# Patient Record
Sex: MALE | Race: WHITE | NOT HISPANIC OR LATINO | Employment: OTHER | URBAN - NONMETROPOLITAN AREA
[De-identification: names, ages, dates, MRNs, and addresses within clinical notes are randomized per-mention and may not be internally consistent; named-entity substitution may affect disease eponyms.]

---

## 2022-12-22 ENCOUNTER — APPOINTMENT (OUTPATIENT)
Dept: PHYSICAL THERAPY | Facility: CLINIC | Age: 72
End: 2022-12-22

## 2022-12-27 ENCOUNTER — APPOINTMENT (EMERGENCY)
Dept: CT IMAGING | Facility: HOSPITAL | Age: 72
End: 2022-12-27

## 2022-12-27 ENCOUNTER — HOSPITAL ENCOUNTER (INPATIENT)
Facility: HOSPITAL | Age: 72
LOS: 3 days | Discharge: HOME | End: 2022-12-30
Attending: STUDENT IN AN ORGANIZED HEALTH CARE EDUCATION/TRAINING PROGRAM | Admitting: FAMILY MEDICINE

## 2022-12-27 ENCOUNTER — APPOINTMENT (OUTPATIENT)
Dept: PHYSICAL THERAPY | Facility: CLINIC | Age: 72
End: 2022-12-27

## 2022-12-27 DIAGNOSIS — R18.8 OTHER ASCITES: ICD-10-CM

## 2022-12-27 DIAGNOSIS — R18.8 ABDOMINAL ASCITES: ICD-10-CM

## 2022-12-27 DIAGNOSIS — K56.609 SBO (SMALL BOWEL OBSTRUCTION) (HCC): Primary | ICD-10-CM

## 2022-12-27 DIAGNOSIS — K42.0 UMBILICAL HERNIA WITH OBSTRUCTION: ICD-10-CM

## 2022-12-27 PROBLEM — I50.33 ACUTE ON CHRONIC DIASTOLIC CHF (CONGESTIVE HEART FAILURE) (HCC): Status: ACTIVE | Noted: 2022-12-27

## 2022-12-27 PROBLEM — K42.9 UMBILICAL HERNIA WITHOUT OBSTRUCTION AND WITHOUT GANGRENE: Status: ACTIVE | Noted: 2022-12-27

## 2022-12-27 PROBLEM — I50.43 ACUTE ON CHRONIC COMBINED SYSTOLIC AND DIASTOLIC CHF (CONGESTIVE HEART FAILURE) (HCC): Status: ACTIVE | Noted: 2022-12-27

## 2022-12-27 PROBLEM — C34.90 LUNG CANCER (HCC): Status: ACTIVE | Noted: 2022-12-27

## 2022-12-27 LAB
ALBUMIN SERPL BCP-MCNC: 1.3 G/DL (ref 3.5–5)
ALP SERPL-CCNC: 93 U/L (ref 46–116)
ALT SERPL W P-5'-P-CCNC: 40 U/L (ref 12–78)
ANION GAP SERPL CALCULATED.3IONS-SCNC: 9 MMOL/L (ref 4–13)
AST SERPL W P-5'-P-CCNC: 48 U/L (ref 5–45)
BASOPHILS # BLD AUTO: 0.02 THOUSANDS/ÂΜL (ref 0–0.1)
BASOPHILS NFR BLD AUTO: 1 % (ref 0–1)
BILIRUB SERPL-MCNC: 0.33 MG/DL (ref 0.2–1)
BILIRUB UR QL STRIP: NEGATIVE
BUN SERPL-MCNC: 26 MG/DL (ref 5–25)
CALCIUM ALBUM COR SERPL-MCNC: 9.8 MG/DL (ref 8.3–10.1)
CALCIUM SERPL-MCNC: 7.6 MG/DL (ref 8.3–10.1)
CHLORIDE SERPL-SCNC: 106 MMOL/L (ref 96–108)
CLARITY UR: CLEAR
CO2 SERPL-SCNC: 26 MMOL/L (ref 21–32)
COLOR UR: YELLOW
CREAT SERPL-MCNC: 1.39 MG/DL (ref 0.6–1.3)
EOSINOPHIL # BLD AUTO: 0.03 THOUSAND/ÂΜL (ref 0–0.61)
EOSINOPHIL NFR BLD AUTO: 1 % (ref 0–6)
ERYTHROCYTE [DISTWIDTH] IN BLOOD BY AUTOMATED COUNT: 14.8 % (ref 11.6–15.1)
GFR SERPL CREATININE-BSD FRML MDRD: 50 ML/MIN/1.73SQ M
GLUCOSE SERPL-MCNC: 145 MG/DL (ref 65–140)
GLUCOSE UR STRIP-MCNC: NEGATIVE MG/DL
HCT VFR BLD AUTO: 38 % (ref 36.5–49.3)
HGB BLD-MCNC: 11.8 G/DL (ref 12–17)
HGB UR QL STRIP.AUTO: NEGATIVE
IMM GRANULOCYTES # BLD AUTO: 0.02 THOUSAND/UL (ref 0–0.2)
IMM GRANULOCYTES NFR BLD AUTO: 1 % (ref 0–2)
INR PPP: 3.29 (ref 0.84–1.19)
KETONES UR STRIP-MCNC: NEGATIVE MG/DL
LACTATE SERPL-SCNC: 1.1 MMOL/L (ref 0.5–2)
LEUKOCYTE ESTERASE UR QL STRIP: NEGATIVE
LYMPHOCYTES # BLD AUTO: 0.95 THOUSANDS/ÂΜL (ref 0.6–4.47)
LYMPHOCYTES NFR BLD AUTO: 22 % (ref 14–44)
MCH RBC QN AUTO: 30.5 PG (ref 26.8–34.3)
MCHC RBC AUTO-ENTMCNC: 31.1 G/DL (ref 31.4–37.4)
MCV RBC AUTO: 98 FL (ref 82–98)
MONOCYTES # BLD AUTO: 0.32 THOUSAND/ÂΜL (ref 0.17–1.22)
MONOCYTES NFR BLD AUTO: 7 % (ref 4–12)
NEUTROPHILS # BLD AUTO: 2.96 THOUSANDS/ÂΜL (ref 1.85–7.62)
NEUTS SEG NFR BLD AUTO: 68 % (ref 43–75)
NITRITE UR QL STRIP: NEGATIVE
NRBC BLD AUTO-RTO: 0 /100 WBCS
PH UR STRIP.AUTO: 5.5 [PH]
PLATELET # BLD AUTO: 231 THOUSANDS/UL (ref 149–390)
PMV BLD AUTO: 10 FL (ref 8.9–12.7)
POTASSIUM SERPL-SCNC: 3.8 MMOL/L (ref 3.5–5.3)
PROT SERPL-MCNC: 4.7 G/DL (ref 6.4–8.4)
PROT UR STRIP-MCNC: NEGATIVE MG/DL
PROTHROMBIN TIME: 33.5 SECONDS (ref 11.6–14.5)
RBC # BLD AUTO: 3.87 MILLION/UL (ref 3.88–5.62)
SODIUM SERPL-SCNC: 141 MMOL/L (ref 135–147)
SP GR UR STRIP.AUTO: 1.02 (ref 1–1.03)
UROBILINOGEN UR QL STRIP.AUTO: 0.2 E.U./DL
WBC # BLD AUTO: 4.3 THOUSAND/UL (ref 4.31–10.16)

## 2022-12-27 RX ORDER — POTASSIUM BICARBONATE 25 MEQ/1
20 TABLET, EFFERVESCENT ORAL DAILY
COMMUNITY
End: 2022-12-30

## 2022-12-27 RX ORDER — SOTALOL HYDROCHLORIDE 80 MG/1
160 TABLET ORAL 2 TIMES DAILY
Status: DISCONTINUED | OUTPATIENT
Start: 2022-12-27 | End: 2022-12-30 | Stop reason: HOSPADM

## 2022-12-27 RX ORDER — ALBUMIN (HUMAN) 12.5 G/50ML
12.5 SOLUTION INTRAVENOUS ONCE
Status: COMPLETED | OUTPATIENT
Start: 2022-12-27 | End: 2022-12-27

## 2022-12-27 RX ORDER — ATORVASTATIN CALCIUM 40 MG/1
80 TABLET, FILM COATED ORAL DAILY
Status: DISCONTINUED | OUTPATIENT
Start: 2022-12-28 | End: 2022-12-30 | Stop reason: HOSPADM

## 2022-12-27 RX ORDER — PANTOPRAZOLE SODIUM 40 MG/1
40 TABLET, DELAYED RELEASE ORAL DAILY
Status: DISCONTINUED | OUTPATIENT
Start: 2022-12-28 | End: 2022-12-30 | Stop reason: HOSPADM

## 2022-12-27 RX ORDER — ACETAMINOPHEN 325 MG/1
650 TABLET ORAL EVERY 6 HOURS PRN
Status: DISCONTINUED | OUTPATIENT
Start: 2022-12-27 | End: 2022-12-30 | Stop reason: HOSPADM

## 2022-12-27 RX ORDER — FENTANYL CITRATE 50 UG/ML
75 INJECTION, SOLUTION INTRAMUSCULAR; INTRAVENOUS ONCE
Status: DISCONTINUED | OUTPATIENT
Start: 2022-12-27 | End: 2022-12-30 | Stop reason: HOSPADM

## 2022-12-27 RX ORDER — BUMETANIDE 0.25 MG/ML
2 INJECTION, SOLUTION INTRAMUSCULAR; INTRAVENOUS 2 TIMES DAILY
Status: COMPLETED | OUTPATIENT
Start: 2022-12-27 | End: 2022-12-28

## 2022-12-27 RX ORDER — FENTANYL CITRATE 50 UG/ML
75 INJECTION, SOLUTION INTRAMUSCULAR; INTRAVENOUS ONCE
Status: COMPLETED | OUTPATIENT
Start: 2022-12-27 | End: 2022-12-27

## 2022-12-27 RX ORDER — CLOPIDOGREL BISULFATE 75 MG/1
75 TABLET ORAL DAILY
Status: DISCONTINUED | OUTPATIENT
Start: 2022-12-28 | End: 2022-12-30 | Stop reason: HOSPADM

## 2022-12-27 RX ORDER — BUMETANIDE 2 MG/1
2 TABLET ORAL
COMMUNITY
End: 2022-12-30

## 2022-12-27 RX ORDER — MELATONIN
2000 DAILY
COMMUNITY

## 2022-12-27 RX ORDER — BUMETANIDE 2 MG/1
2 TABLET ORAL DAILY
COMMUNITY
Start: 2022-11-27 | End: 2022-12-30

## 2022-12-27 RX ORDER — ONDANSETRON 2 MG/ML
4 INJECTION INTRAMUSCULAR; INTRAVENOUS EVERY 6 HOURS PRN
Status: DISCONTINUED | OUTPATIENT
Start: 2022-12-27 | End: 2022-12-30 | Stop reason: HOSPADM

## 2022-12-27 RX ORDER — ISOSORBIDE MONONITRATE 60 MG/1
60 TABLET, EXTENDED RELEASE ORAL DAILY
Status: DISCONTINUED | OUTPATIENT
Start: 2022-12-28 | End: 2022-12-30 | Stop reason: HOSPADM

## 2022-12-27 RX ORDER — GUAIFENESIN 100 MG/5ML
200 SOLUTION ORAL EVERY 4 HOURS PRN
Status: DISCONTINUED | OUTPATIENT
Start: 2022-12-27 | End: 2022-12-29

## 2022-12-27 RX ORDER — MAGNESIUM 30 MG
400 TABLET ORAL DAILY
COMMUNITY

## 2022-12-27 RX ADMIN — ALBUMIN (HUMAN) 12.5 G: 0.25 INJECTION, SOLUTION INTRAVENOUS at 20:10

## 2022-12-27 RX ADMIN — BUMETANIDE 2 MG: 0.25 INJECTION, SOLUTION INTRAMUSCULAR; INTRAVENOUS at 20:10

## 2022-12-27 RX ADMIN — IOHEXOL 100 ML: 350 INJECTION, SOLUTION INTRAVENOUS at 15:17

## 2022-12-27 RX ADMIN — SOTALOL HYDROCHLORIDE 160 MG: 80 TABLET ORAL at 20:10

## 2022-12-27 RX ADMIN — GUAIFENESIN 200 MG: 200 SOLUTION ORAL at 22:17

## 2022-12-27 RX ADMIN — FENTANYL CITRATE 75 MCG: 50 INJECTION INTRAMUSCULAR; INTRAVENOUS at 14:24

## 2022-12-27 NOTE — ASSESSMENT & PLAN NOTE
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, RLL  9/13/18-11/15/18: 4 cycles of carboplatin, pemetrexed and pembrolizumab  12/17/18-8/13/20: maintenance pemetrexed and pembrolizumb - stopped given hypoalbuminemia/pleural effusions/swelling after 2 years of treatment- now on observation  Also has known mesenteric mass  Follow up outpatient with heme-onc

## 2022-12-27 NOTE — H&P
[address]  H&P- Zana Harris 1950, 71 y o  male MRN: [id number]  Unit/Bed#: CLYDE Encounter: [id number]  Primary Care Provider: Stevan Lawrence MD   Date and time admitted to hospital: 12/27/2022  1:43 PM    * SBO (small bowel obstruction) [facility])  Assessment & Plan  Patient presented with sbo on admission  Ct abd Small bowel obstruction with transition point located within an umbilical hernia containing a short segment of mid small bowel  Reidentified known approximate 5 cm left mesenteric mass  Mesenteric vasculature distal to this mass is engorged as also described on outside reports  Nodular hepatic contours may indicate cirrhosis/hepatocellular disease    Large quantity of abdominal ascites  Moderate right and small left pleural effusions  he also had an umbilical hernia present on admission which was manually reduced by general surgery in the ED  Will place on a clear liquid diet and advance as tolerated    Acute on chronic diastolic CHF (congestive heart failure) ([facility] [address] )  Assessment & Plan  Wt Readings from Last 3 Encounters:   12/27/22 88 kg (194 lb 0 1 oz)       Patient takes Bumex 2 mg daily on even days and 2 mg twice daily on days  Presents with lower extremity edema and ascites  Also has history of underlying malignancy with mesenteric mass which may also be contributing to ascites  Placed on bumex 2 mg IV twice daily and monitor electrolytes closely  Also asked critical care for paracentesis  Hold Coumadin for now  No 2D echo available in the system for review    Ordered 2D echo to evaluate LV function      Lung cancer [facility])  Assessment & Plan  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, RLL  9/13/18-11/15/18: 4 cycles of carboplatin, pemetrexed and pembrolizumab  12/17/18-8/13/20: maintenance pemetrexed and pembrolizumb - stopped given hypoalbuminemia/pleural effusions/swelling after 2 years of treatment- now on observation  Also has known mesenteric mass  Follow up outpatient with heme-onc      Other ascites  Assessment & Plan  Secondary to CHF versus underlying malignancy or secondary to liver dysfunction  Ask critical care for paracentesis  Could Also be worsening small bowel obstruction  No known history of liver cirrhosis or hepatitis or alcohol abuse    Umbilical hernia without obstruction and without gangrene  Assessment & Plan  umbilical hernia without obstruction or gangrene present admission which was manually reduced by general surgery however will need outpatient follow-up    VTE Prophylaxis: Warfarin (Coumadin)  / sequential compression device   Code Status: full code  POLST: There is no POLST form on file for this patient (pre-hospital)  Discussion with family: Discussed with wife at bedside    Anticipated Length of Stay:  Patient will be admitted on an Inpatient basis with an anticipated length of stay of more than 2 midnights  Justification for Hospital Stay: Small bowel obstruction    Total Time for Visit, including Counseling / Coordination of Care: 60 minutes  Greater than 50% of this total time spent on direct patient counseling and coordination of care  Chief Complaint:   abdominal pain    History of Present Illness: Sabino Roberts is a 71 y o  male who presents with abdominal pain that started yesterday night  Patient states that he had periumbilical pain and also felt his hernia popping out  Difficulty tolerating food  Denies any diarrhea  No vomiting reported  Complaining of more bloating in the abdomen and some leg swelling reported which is worse than baseline    Review of Systems:    Review of Systems   Constitutional: Positive for appetite change and fatigue  Negative for chills and fever  HENT: Negative for hearing loss, sore throat and trouble swallowing  Eyes: Negative for photophobia, discharge and visual disturbance  Respiratory: Negative for chest tightness and shortness of breath      Cardiovascular: Positive for leg swelling  Negative for chest pain and palpitations  Gastrointestinal: Positive for abdominal distention, abdominal pain and nausea  Negative for blood in stool and vomiting  Endocrine: Negative for polydipsia and polyuria  Genitourinary: Negative for difficulty urinating, dysuria, flank pain and hematuria  Musculoskeletal: Negative for back pain and gait problem  Skin: Negative for rash  Allergic/Immunologic: Negative for environmental allergies and food allergies  Neurological: Negative for dizziness, seizures, syncope and headaches  Hematological: Does not bruise/bleed easily  Psychiatric/Behavioral: Negative for behavioral problems  All other systems reviewed and are negative  Past Medical and Surgical History:     Past Medical History:   Diagnosis Date   • CHF (congestive heart failure) ([facility] )    • Lung cancer ([facility] )     stage 4       Past Surgical History:   Procedure Laterality Date   • CHOLECYSTECTOMY         Meds/Allergies:    Prior to Admission medications    Medication Sig Start Date End Date Taking?  Authorizing Provider   bumetanide (BUMEX) 2 mg tablet Take 2 mg by mouth in the morning 11/27/22 12/31/22 Yes Historical Provider, MD   bumetanide (BUMEX) 2 mg tablet Take 2 mg by mouth every other day In the afternoon on odd days   Yes Historical Provider, MD   atorvastatin (LIPITOR) 80 mg tablet Take 80 mg by mouth daily    Historical Provider, MD   clopidogrel (PLAVIX) 75 mg tablet Take 75 mg by mouth daily    Historical Provider, MD   isosorbide mononitrate (IMDUR) 60 mg 24 hr tablet Take 60 mg by mouth daily    Historical Provider, MD   losartan (COZAAR) 100 MG tablet Take 25 mg by mouth daily    Historical Provider, MD   pantoprazole (PROTONIX) 40 mg tablet Take 40 mg by mouth daily    Historical Provider, MD   sotalol (BETAPACE) 160 MG tablet Take 160 mg by mouth 2 (two) times a day    Historical Provider, MD   warfarin (COUMADIN) 2 5 mg tablet Take by mouth daily Historical Provider, MD   aspirin 81 mg chewable tablet Chew 81 mg daily  22  Historical Provider, MD     I have reviewed home medications with patient personally  Allergies: Allergies   Allergen Reactions   • Acetaminophen Other (See Comments)     Unable to take with Percocet   • Emend [Aprepitant] Hives   • Percocet [Oxycodone-Acetaminophen] Delirium       Social History:     Marital Status: /Civil Union     Social History     Substance and Sexual Activity   Alcohol Use Not Currently     Social History     Tobacco Use   Smoking Status Former   • Types: Cigarettes   • Quit date:    • Years since quittin 0   • Passive exposure: Past   Smokeless Tobacco Never     Social History     Substance and Sexual Activity   Drug Use Never       Family History:    Family History   Problem Relation Age of Onset   • Hypertension Father        Physical Exam:     Vitals:   Blood Pressure: 118/81 (22 1243)  Pulse: 63 (22 1243)  Temperature: (!) 97 2 °F (36 2 °C) (22 1243)  Temp Source: Temporal (22 1243)  Respirations: 18 (22 1243)  Height: 5' 7" (170 2 cm) (22 1243)  Weight - Scale: 88 kg (194 lb 0 1 oz) (22 1243)  SpO2: 95 % (22 1243)    Physical Exam  Vitals and nursing note reviewed  Constitutional:       Appearance: He is ill-appearing  HENT:      Head: Normocephalic and atraumatic  Right Ear: External ear normal       Left Ear: External ear normal       Nose: Nose normal       Mouth/Throat:      Pharynx: Oropharynx is clear  Eyes:      Pupils: Pupils are equal, round, and reactive to light  Cardiovascular:      Rate and Rhythm: Normal rate and regular rhythm  Heart sounds: Normal heart sounds  Pulmonary:      Effort: Pulmonary effort is normal       Breath sounds: Normal breath sounds  Abdominal:      General: Bowel sounds are normal  There is distension  Palpations: Abdomen is soft  Tenderness:  There is abdominal tenderness  Comments: reducible hernia noted at the umbilicus   Musculoskeletal:         General: Normal range of motion  Cervical back: Normal range of motion and neck supple  Right lower leg: Edema present  Left lower leg: Edema present  Skin:     General: Skin is warm and dry  Capillary Refill: Capillary refill takes less than 2 seconds  Neurological:      General: No focal deficit present  Mental Status: He is alert and oriented to person, place, and time  Psychiatric:         Mood and Affect: Mood normal            Additional Data:     Lab Results: I have personally reviewed pertinent reports  Results from last 7 days   Lab Units 12/27/22  1423   WBC Thousand/uL 4 30*   HEMOGLOBIN g/dL 11 8*   HEMATOCRIT % 38 0   PLATELETS Thousands/uL 231   NEUTROS PCT % 68   LYMPHS PCT % 22   MONOS PCT % 7   EOS PCT % 1     Results from last 7 days   Lab Units 12/27/22  1423   SODIUM mmol/L 141   POTASSIUM mmol/L 3 8   CHLORIDE mmol/L 106   CO2 mmol/L 26   BUN mg/dL 26*   CREATININE mg/dL 1 39*   ANION GAP mmol/L 9   CALCIUM mg/dL 7 6*   ALBUMIN g/dL 1 3*   TOTAL BILIRUBIN mg/dL 0 33   ALK PHOS U/L 93   ALT U/L 40   AST U/L 48*   GLUCOSE RANDOM mg/dL 145*                 Results from last 7 days   Lab Units 12/27/22  1423   LACTIC ACID mmol/L 1 1       Imaging: I have personally reviewed pertinent reports  CT abdomen pelvis with contrast   Final Result by Ector Kellogg MD (12/27 5619)      Small bowel obstruction with transition point located within an umbilical hernia containing a short segment of mid small bowel  Reidentified known approximate 5 cm left mesenteric mass  Mesenteric vasculature distal to this mass is engorged as also described on outside reports  Nodular hepatic contours may indicate cirrhosis/hepatocellular disease  Large quantity of abdominal ascites  Moderate right and small left pleural effusions        The study was marked in [facility] for immediate notification  Workstation performed: [id number]             EKG, Pathology, and Other Studies Reviewed on Admission:   · EKG: reviewed    Allscripts / Epic Records Reviewed: Yes     ** Please Note: This note has been constructed using a voice recognition system   **

## 2022-12-27 NOTE — ASSESSMENT & PLAN NOTE
Secondary to CHF versus underlying malignancy or secondary to liver dysfunction  Ask critical care for paracentesis  Could Also be worsening small bowel obstruction  No known history of liver cirrhosis or hepatitis or alcohol abuse

## 2022-12-27 NOTE — ASSESSMENT & PLAN NOTE
Wt Readings from Last 3 Encounters:   12/27/22 88 kg (194 lb 0 1 oz)       Patient takes Bumex 2 mg daily on even days and 2 mg twice daily on days  Presents with lower extremity edema and ascites  Also has history of underlying malignancy with mesenteric mass which may also be contributing to ascites  Placed on bumex 2 mg IV twice daily and monitor electrolytes closely  Also asked critical care for paracentesis  Hold Coumadin for now  No 2D echo available in the system for review    Ordered 2D echo to evaluate LV function

## 2022-12-27 NOTE — ASSESSMENT & PLAN NOTE
Patient presented with sbo on admission  Ct abd Small bowel obstruction with transition point located within an umbilical hernia containing a short segment of mid small bowel  Reidentified known approximate 5 cm left mesenteric mass  Mesenteric vasculature distal to this mass is engorged as also described on outside reports  Nodular hepatic contours may indicate cirrhosis/hepatocellular disease    Large quantity of abdominal ascites  Moderate right and small left pleural effusions  he also had an umbilical hernia present on admission which was manually reduced by general surgery in the ED    Will place on a clear liquid diet and advance as tolerated

## 2022-12-27 NOTE — CONSULTS
Consultation - General Surgery   David Hernandez 71 y o  male MRN: [id number]  Unit/Bed#: TR 13A Encounter: [id number]    Assessment/Plan     Assessment:    Small bowel obstruction  Umbilical Hernia containing bowel and ascites- reduced  Stage IV Lung cancer  Mesenteric mass  Ascites  Cirrhosis  CHF  Atrial fibrillation - on Warfarin  CAD - s/p PCI/Stent - on Plavix  KIRA  Anemia of Chronic   Malnutrition-Hypoalbuminemia    Plan: Bowel reduced at bedside and symptoms improved  Patient would be high risk of mortality for surgical intervention in the emergent setting given his Ascites, CHF, Malnutrition with stage IV lung CA and undiagnosed mesenteric mass  Ideally his umbilical hernia would be repaired at a tertiary care center where he receives his cancer care and biopsy of the mesenteric mass could be entertained at that time  For now recommend observing the patient with bowel obstruction now that his bowel has been reduced and the obstruction should be relived  Recommend a small bowel follow through series (gastrograffin challenge) tomorrow morning to ensure passage of contrast to the colon  Supportive care, NPO for now  Hold Plavix and Warfarin for now  History of Present Illness     HPI:  David Hernandez is a 71 y o  male who presents with Stage IV adenocarcinoma of the lung s/p Chemotherapy  He has a known mesenteric mass and ascites of unknown origin- concern is for metastatic disease  He has CHF, atrial fibrillation  He is on Warfarin  He is on Plavix for CAD s/p PCI/Stent  He states he developed abdominal pain overnight which continued into this morning with nausea  He had not passed flatus since yesterday  He has a known hernia and states his pain was worse over the hernia today and felt that he'd better get checked out  He has had the hernia for a long time and states it has never given him a problem before  He denies any other abdominal surgeries       Consult to surgery general  Consult performed by: Shaista Rowland DO  Consult ordered by: Shaina Lion DO          Review of Systems   Constitutional: Negative  HENT: Negative  Eyes: Negative  Respiratory: Negative  Cardiovascular: Negative  Gastrointestinal: Positive for abdominal distention, abdominal pain and nausea  Negative for blood in stool and diarrhea  Endocrine: Negative  Genitourinary: Negative  Musculoskeletal: Negative  Skin: Negative  Allergic/Immunologic: Negative  Neurological: Negative  Hematological: Negative  Psychiatric/Behavioral: Negative  Historical Information   Past Medical History:   Diagnosis Date   • CHF (congestive heart failure) ([facility] )    • Lung cancer ([facility] )     stage 4     History reviewed  No pertinent surgical history    Social History   Social History     Substance and Sexual Activity   Alcohol Use Not Currently     Social History     Substance and Sexual Activity   Drug Use Never     E-Cigarette/Vaping   • E-Cigarette Use Never User      E-Cigarette/Vaping Substances     Social History     Tobacco Use   Smoking Status Former   • Types: Cigarettes   • Quit date:    • Years since quittin 0   • Passive exposure: Past   Smokeless Tobacco Never     Family History: non-contributory    Meds/Allergies   all current active meds have been reviewed  Allergies   Allergen Reactions   • Acetaminophen Other (See Comments)     Unable to take with Percocet   • Emend [Aprepitant] Hives   • Percocet [Oxycodone-Acetaminophen] Delirium       Objective   First Vitals:   Blood Pressure: 118/81 (22 1243)  Pulse: 63 (22 1243)  Temperature: (!) 97 2 °F (36 2 °C) (22 1243)  Temp Source: Temporal (22 1243)  Respirations: 18 (22 1243)  Height: 5' 7" (170 2 cm) (22 1243)  Weight - Scale: 88 kg (194 lb 0 1 oz) (22 1243)  SpO2: 95 % (22 1243)    Current Vitals:   Blood Pressure: 118/81 (22 1243)  Pulse: 63 (22 1243)  Temperature: (!) 97 2 °F (36 2 °C) (12/27/22 1243)  Temp Source: Temporal (12/27/22 1243)  Respirations: 18 (12/27/22 1243)  Height: 5' 7" (170 2 cm) (12/27/22 1243)  Weight - Scale: 88 kg (194 lb 0 1 oz) (12/27/22 1243)  SpO2: 95 % (12/27/22 1243)    No intake or output data in the 24 hours ending 12/27/22 1654    Invasive Devices     Peripheral Intravenous Line  Duration           Peripheral IV 12/27/22 Distal;Right;Upper;Ventral (anterior) Arm <1 day                Physical Exam  Vitals and nursing note reviewed  Constitutional:       General: He is not in acute distress  Appearance: He is not ill-appearing  HENT:      Head: Normocephalic  Mouth/Throat:      Mouth: Mucous membranes are moist       Pharynx: Oropharynx is clear  Eyes:      Conjunctiva/sclera: Conjunctivae normal       Pupils: Pupils are equal, round, and reactive to light  Cardiovascular:      Rate and Rhythm: Normal rate and regular rhythm  Pulses: Normal pulses  Pulmonary:      Effort: Pulmonary effort is normal    Abdominal:      Comments: Soft, Tender over umbilical hernia which is engorged with ascites  Hernia is soft  There is bowel palpable which is reducible  No overlying skin breakdown or compromise  No palpable masses, no rebound or guarding  + fluid wave  Musculoskeletal:      Cervical back: Normal range of motion  Skin:     General: Skin is warm  Capillary Refill: Capillary refill takes less than 2 seconds  Neurological:      General: No focal deficit present  Mental Status: He is alert and oriented to person, place, and time  Lab Results:   I have personally reviewed pertinent lab results    , CBC:   Lab Results   Component Value Date    WBC 4 30 (L) 12/27/2022    HGB 11 8 (L) 12/27/2022    HCT 38 0 12/27/2022    MCV 98 12/27/2022     12/27/2022    MCH 30 5 12/27/2022    MCHC 31 1 (L) 12/27/2022    RDW 14 8 12/27/2022    MPV 10 0 12/27/2022    NRBC 0 12/27/2022   , CMP:   Lab Results   Component Value Date    SODIUM 141 12/27/2022    K 3 8 12/27/2022     12/27/2022    CO2 26 12/27/2022    BUN 26 (H) 12/27/2022    CREATININE 1 39 (H) 12/27/2022    CALCIUM 7 6 (L) 12/27/2022    AST 48 (H) 12/27/2022    ALT 40 12/27/2022    ALKPHOS 93 12/27/2022    EGFR 50 12/27/2022     Imaging: I have personally reviewed pertinent reports  and I have personally reviewed pertinent films in PACS  EKG, Pathology, and Other Studies: I have personally reviewed pertinent reports  Counseling / Coordination of Care  Total floor / unit time spent today 60 minutes  Greater than 50% of total time was spent with the patient and / or family counseling and / or coordination of care  A description of the counseling / coordination of care: discussion with ED, counseling patient regarding his disease process and expectations        Dustin Roa

## 2022-12-27 NOTE — LETTER
[facility]  [address] [place] [address]      January 6, 2023    MRN: [id number]     Phone: [phone]     Dear Mr Wood Story recently had a(n)  performed on  at  [facility] that was requested by Apolinar Babcock DO  The study was reviewed by a radiologist, which is a physician who specializes in medical imaging  The radiologist issued a report describing his or her findings  In that report there was a finding that the radiologist felt warranted further discussion with your health care provider and that discussion would be beneficial to you  The results were sent to Apolinar Babcock DO on    We recommend that you contact Apolinar Babcock DO at  or set up an appointment to discuss the results of the imaging test  If you have already heard from Apolinar Babcock DO regarding the results of your study, you can disregard this letter  This letter is not meant to alarm you, but intended to encourage you to follow-up on your results with the provider that sent you for the imaging study  In addition, we have enclosed answers to frequently asked questions by other patients who have also received a letter to review results with their health care provider (see page two)  Thank you for choosing [facility] for your medical imaging needs  FREQUENTLY ASKED QUESTIONS    1  Why am I receiving this letter? [facility] requires us to notify patients who have findings on imaging exams that may require more testing or follow-up with a health professional within the next 3 months  2  How serious is the finding on the imaging test?  This letter is sent to all patients who may need follow-up or more testing within the next 3 months    Receiving this letter does not necessarily mean you have a life-threatening imaging finding or disease  Recommendations in the radiologist’s imaging report are general in nature and it is up to your healthcare provider to say whether those recommendations make sense for your situation  You are strongly encouraged to talk to your health care provider about the results and ask whether additional steps need to be taken  3  Where can I get a copy of the final report for my recent radiology exam?  To get a full copy of the report you can access your records online at [email or web address] or please contact [facility] at [phone] Monday through Friday between 8 am and 6 pm          4  What do I need to do now? Please contact your health care provider who requested the imaging study to discuss what further actions (if any) are needed  You may have already heard from (your ordering provider) in regard to this test in which case you can disregard this letter  NOTICE IN ACCORDANCE WITH THE [place] STATE “PATIENT TEST RESULT INFORMATION ACT OF 2018”    You are receiving this notice as a result of a determination by your diagnostic imaging service that further discussions of your test results are warranted and would be beneficial to you  The complete results of your test or tests have been or will be sent to the health care practitioner that ordered the test or tests  It is recommended that you contact your health care practitioner to discuss your results as soon as possible

## 2022-12-27 NOTE — ASSESSMENT & PLAN NOTE
umbilical hernia without obstruction or gangrene present admission which was manually reduced by general surgery however will need outpatient follow-up

## 2022-12-27 NOTE — ED PROVIDER NOTES
History  Chief Complaint   Patient presents with   • Abdominal Pain     Pt c/o abdomial pain starting last night stating "feels bloated"  Took gasx w/o relief  Hx umbilical hernia, chf  Denies travel/sob/fever/cough/n/v/d       History provided by:  Patient  Abdominal Pain  Pain location:  Periumbilical  Pain quality: stabbing and throbbing    Pain radiates to:  Does not radiate  Pain severity:  Moderate  Onset quality:  Gradual  Duration:  1 day  Timing:  Constant  Progression:  Worsening  Chronicity:  New  Context comment:  Hx of umbilical hernia  Worsening pain around the hernia  Having decreased flatus  Normal BM this AM  Denies nausea/vomiting/diarrhea  Relieved by:  Nothing  Worsened by: Movement, coughing and position changes  Associated symptoms: no anorexia, no chest pain, no chills, no constipation, no cough, no diarrhea, no dysuria, no fatigue, no fever, no nausea, no shortness of breath, no sore throat and no vomiting      Prior to Admission Medications   Prescriptions Last Dose Informant Patient Reported? Taking?   aspirin 81 mg chewable tablet   Yes No   Sig: Chew 81 mg daily   atorvastatin (LIPITOR) 80 mg tablet   Yes No   Sig: Take 80 mg by mouth daily   clopidogrel (PLAVIX) 75 mg tablet   Yes No   Sig: Take 75 mg by mouth daily   isosorbide mononitrate (IMDUR) 60 mg 24 hr tablet   Yes No   Sig: Take 60 mg by mouth daily   losartan (COZAAR) 100 MG tablet   Yes No   Sig: Take 25 mg by mouth daily   pantoprazole (PROTONIX) 40 mg tablet   Yes No   Sig: Take 40 mg by mouth daily   sotalol (BETAPACE) 160 MG tablet   Yes No   Sig: Take 160 mg by mouth 2 (two) times a day   warfarin (COUMADIN) 2 5 mg tablet   Yes No   Sig: Take by mouth daily      Facility-Administered Medications: None     Past Medical History:   Diagnosis Date   • CHF (congestive heart failure) (HCC)    • Lung cancer (HCC)     stage 4     History reviewed  No pertinent surgical history  History reviewed   No pertinent family history  I have reviewed and agree with the history as documented  E-Cigarette/Vaping   • E-Cigarette Use Never User      E-Cigarette/Vaping Substances     Social History     Tobacco Use   • Smoking status: Former     Types: Cigarettes     Quit date:      Years since quittin 0     Passive exposure: Past   • Smokeless tobacco: Never   Vaping Use   • Vaping Use: Never used   Substance Use Topics   • Alcohol use: Not Currently   • Drug use: Never     Review of Systems   Constitutional: Positive for activity change and appetite change  Negative for chills, fatigue and fever  HENT: Negative for congestion, rhinorrhea, sinus pressure, sinus pain and sore throat  Eyes: Negative for photophobia, pain, discharge and redness  Respiratory: Negative for cough, chest tightness, shortness of breath and wheezing  Cardiovascular: Negative for chest pain and palpitations  Gastrointestinal: Positive for abdominal pain  Negative for abdominal distention, anorexia, blood in stool, constipation, diarrhea, nausea and vomiting  Genitourinary: Negative for decreased urine volume, difficulty urinating, dysuria, flank pain, frequency and urgency  Musculoskeletal: Negative for back pain, myalgias, neck pain and neck stiffness  Skin: Negative for color change, pallor, rash and wound  Neurological: Negative for dizziness, syncope, weakness, light-headedness and headaches  Hematological: Does not bruise/bleed easily  Psychiatric/Behavioral: Positive for sleep disturbance  Negative for confusion  All other systems reviewed and are negative  Physical Exam  Physical Exam  Vitals and nursing note reviewed  Exam conducted with a chaperone present  Constitutional:       General: He is in acute distress  Appearance: He is not ill-appearing or toxic-appearing  HENT:      Head: Normocephalic and atraumatic  Eyes:      General: No scleral icterus  Extraocular Movements: Extraocular movements intact  Pupils: Pupils are equal, round, and reactive to light  Cardiovascular:      Rate and Rhythm: Normal rate and regular rhythm  Heart sounds: Normal heart sounds  No murmur heard  Pulmonary:      Effort: Pulmonary effort is normal  No respiratory distress  Breath sounds: Normal breath sounds  No stridor  No wheezing, rhonchi or rales  Chest:      Chest wall: No tenderness  Abdominal:      General: Bowel sounds are normal       Palpations: Abdomen is soft  Tenderness: There is abdominal tenderness in the periumbilical area  Hernia: A hernia is present  Hernia is present in the umbilical area  Comments: TTP along the umbilical hernia  Unable to reduce hernia due to pain  Normoactive bowel sounds  Skin:     General: Skin is warm and dry  Capillary Refill: Capillary refill takes less than 2 seconds  Coloration: Skin is pale  Skin is not cyanotic, jaundiced or mottled  Findings: No erythema or rash  Neurological:      General: No focal deficit present  Mental Status: He is alert and oriented to person, place, and time  Cranial Nerves: No cranial nerve deficit  Motor: No weakness  Psychiatric:         Mood and Affect: Mood is not anxious or depressed           Behavior: Behavior normal        Vital Signs  ED Triage Vitals [12/27/22 1243]   Temperature Pulse Respirations Blood Pressure SpO2   (!) 97 2 °F (36 2 °C) 63 18 118/81 95 %      Temp Source Heart Rate Source Patient Position - Orthostatic VS BP Location FiO2 (%)   Temporal Monitor Sitting Left arm --      Pain Score       4           Vitals:    12/27/22 1243   BP: 118/81   Pulse: 63   Patient Position - Orthostatic VS: Sitting     ED Medications  Medications   fentanyl citrate (PF) 100 MCG/2ML 75 mcg (has no administration in time range)   fentanyl citrate (PF) 100 MCG/2ML 75 mcg (75 mcg Intravenous Given 12/27/22 1424)   iohexol (OMNIPAQUE) 350 MG/ML injection (MULTI-DOSE) 100 mL (100 mL Intravenous Given 12/27/22 1517)     Diagnostic Studies  Results Reviewed     Procedure Component Value Units Date/Time    Lactic acid, plasma [[id number]]  (Normal) Collected: 12/27/22 1423    Lab Status: Final result Specimen: Blood from Arm, Right Updated: 12/27/22 1448     LACTIC ACID 1 1 mmol/L     Narrative:      Result may be elevated if tourniquet was used during collection      Comprehensive metabolic panel [[id number]]  (Abnormal) Collected: 12/27/22 1423    Lab Status: Final result Specimen: Blood from Arm, Right Updated: 12/27/22 1448     Sodium 141 mmol/L      Potassium 3 8 mmol/L      Chloride 106 mmol/L      CO2 26 mmol/L      ANION GAP 9 mmol/L      BUN 26 mg/dL      Creatinine 1 39 mg/dL      Glucose 145 mg/dL      Calcium 7 6 mg/dL      Corrected Calcium 9 8 mg/dL      AST 48 U/L      ALT 40 U/L      Alkaline Phosphatase 93 U/L      Total Protein 4 7 g/dL      Albumin 1 3 g/dL      Total Bilirubin 0 33 mg/dL      eGFR 50 ml/min/1 73sq m     Narrative:      [place] guidelines for Chronic Kidney Disease (CKD):   •  Stage 1 with normal or high GFR (GFR > 90 mL/min/1 73 square meters)  •  Stage 2 Mild CKD (GFR = 60-89 mL/min/1 73 square meters)  •  Stage 3A Moderate CKD (GFR = 45-59 mL/min/1 73 square meters)  •  Stage 3B Moderate CKD (GFR = 30-44 mL/min/1 73 square meters)  •  Stage 4 Severe CKD (GFR = 15-29 mL/min/1 73 square meters)  •  Stage 5 End Stage CKD (GFR <15 mL/min/1 73 square meters)  Note: GFR calculation is accurate only with a steady state creatinine    UA w Reflex to Microscopic w Reflex to Culture [[id number]] Collected: 12/27/22 1423    Lab Status: Final result Specimen: Urine, Clean Catch Updated: 12/27/22 1430     Color, UA Yellow     Clarity, UA Clear     Specific [place], UA 1 020     pH, UA 5 5     Leukocytes, UA Negative     Nitrite, UA Negative     Protein, UA Negative mg/dl      Glucose, UA Negative mg/dl      Ketones, UA Negative mg/dl Urobilinogen, UA 0 2 E U /dl      Bilirubin, UA Negative     Occult Blood, UA Negative    CBC and differential [[id number]]  (Abnormal) Collected: 12/27/22 1423    Lab Status: Final result Specimen: Blood from Arm, Right Updated: 12/27/22 1429     WBC 4 30 Thousand/uL      RBC 3 87 Million/uL      Hemoglobin 11 8 g/dL      Hematocrit 38 0 %      MCV 98 fL      MCH 30 5 pg      MCHC 31 1 g/dL      RDW 14 8 %      MPV 10 0 fL      Platelets 690 Thousands/uL      nRBC 0 /100 WBCs      Neutrophils Relative 68 %      Immat GRANS % 1 %      Lymphocytes Relative 22 %      Monocytes Relative 7 %      Eosinophils Relative 1 %      Basophils Relative 1 %      Neutrophils Absolute 2 96 Thousands/µL      Immature Grans Absolute 0 02 Thousand/uL      Lymphocytes Absolute 0 95 Thousands/µL      Monocytes Absolute 0 32 Thousand/µL      Eosinophils Absolute 0 03 Thousand/µL      Basophils Absolute 0 02 Thousands/µL              CT abdomen pelvis with contrast   Final Result by Chris Kelley MD (12/27 5039)      Small bowel obstruction with transition point located within an umbilical hernia containing a short segment of mid small bowel  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 for immediate notification  Workstation performed: [id number]                Procedures  Procedures     ED Course  ED Course as of 12/27/22 1720   Tue Dec 27, 2022   1443 UA without signs of infection  Stable leukopenia; hemoglobin is baseline  1451 Renal function baseline  No other significant abnormalities noted on CMP   1609 Pain improved with IV Fentanyl   CT imaging sig for SBO with transition point at the umbilical hernia  Mesenteric mass and ascites re-identified  Large amount abdominal ascites and pleural effusions  Dr Cait Diaz (General Surgery) contacted via TT     [address] Surgery able to reduce the hernia at bedside  Will perform small bowel follow through tomorrow  Denies nausea/vomiting at this time  Will administer an additional dose of IV Fentanyl  Admit to SLIM  SBIRT 20yo+    Flowsheet Row Most Recent Value   SBIRT (23 yo +)    In order to provide better care to our patients, we are screening all of our patients for alcohol and drug use  Would it be okay to ask you these screening questions? No Filed at: 12/27/2022 1359        MDM    Disposition  Final diagnoses:   SBO (small bowel obstruction) ([facility] )   Umbilical hernia with obstruction   Abdominal ascites     Time reflects when diagnosis was documented in both MDM as applicable and the Disposition within this note     Time User Action Codes Description Comment    12/27/2022  4:55 PM Sariah Gent Add [K56 609] SBO (small bowel obstruction) ([facility] [address] )     12/27/2022  4:55 PM Sariah Gent Add [A73 4] Umbilical hernia with obstruction     12/27/2022  4:56 PM Sariah Gent Add [R18 8] Abdominal ascites       ED Disposition     ED Disposition   Admit    Condition   Stable    Date/Time   Tue Dec 27, 2022  5:16 PM    Comment   Case was discussed with Dr Charlotte Polk and the patient's admission status was agreed to be Admission Status: inpatient status to the service of Dr Charlotte Polk   Follow-up Information    None         Patient's Medications   Discharge Prescriptions    No medications on file       No discharge procedures on file      PDMP Review     None          ED Provider  Electronically Signed by           Tiffany Huddleston DO  12/27/22 1637

## 2022-12-28 ENCOUNTER — APPOINTMENT (INPATIENT)
Dept: RADIOLOGY | Facility: HOSPITAL | Age: 72
End: 2022-12-28

## 2022-12-28 ENCOUNTER — APPOINTMENT (INPATIENT)
Dept: NON INVASIVE DIAGNOSTICS | Facility: HOSPITAL | Age: 72
End: 2022-12-28

## 2022-12-28 LAB
ALBUMIN SERPL BCP-MCNC: 1.3 G/DL (ref 3.5–5)
ALP SERPL-CCNC: 77 U/L (ref 46–116)
ALT SERPL W P-5'-P-CCNC: 31 U/L (ref 12–78)
ANION GAP SERPL CALCULATED.3IONS-SCNC: 9 MMOL/L (ref 4–13)
AORTIC ROOT: 4.1 CM
AORTIC VALVE MEAN VELOCITY: 6.6 M/S
APICAL FOUR CHAMBER EJECTION FRACTION: 60 %
ASCENDING AORTA: 3.2 CM
AST SERPL W P-5'-P-CCNC: 36 U/L (ref 5–45)
AV AREA BY CONTINUOUS VTI: 5 CM2
AV AREA PEAK VELOCITY: 4.1 CM2
AV LVOT MEAN GRADIENT: 2 MMHG
AV LVOT PEAK GRADIENT: 4 MMHG
AV MEAN GRADIENT: 2 MMHG
AV PEAK GRADIENT: 4 MMHG
AV VALVE AREA: 4.95 CM2
AV VELOCITY RATIO: 0.98
BILIRUB SERPL-MCNC: 0.28 MG/DL (ref 0.2–1)
BUN SERPL-MCNC: 22 MG/DL (ref 5–25)
CALCIUM ALBUM COR SERPL-MCNC: 9.9 MG/DL (ref 8.3–10.1)
CALCIUM SERPL-MCNC: 7.7 MG/DL (ref 8.3–10.1)
CHLORIDE SERPL-SCNC: 109 MMOL/L (ref 96–108)
CO2 SERPL-SCNC: 27 MMOL/L (ref 21–32)
CREAT SERPL-MCNC: 1.2 MG/DL (ref 0.6–1.3)
DOP CALC AO PEAK VEL: 1.05 M/S
DOP CALC AO VTI: 20.78 CM
DOP CALC LVOT AREA: 4.15 CM2
DOP CALC LVOT DIAMETER: 2.3 CM
DOP CALC LVOT PEAK VEL VTI: 24.77 CM
DOP CALC LVOT PEAK VEL: 1.03 M/S
DOP CALC LVOT STROKE INDEX: 52.3 ML/M2
DOP CALC LVOT STROKE VOLUME: 102.86 CM3
E WAVE DECELERATION TIME: 198 MS
ERYTHROCYTE [DISTWIDTH] IN BLOOD BY AUTOMATED COUNT: 14.7 % (ref 11.6–15.1)
FRACTIONAL SHORTENING: 23 % (ref 28–44)
GFR SERPL CREATININE-BSD FRML MDRD: 60 ML/MIN/1.73SQ M
GLUCOSE SERPL-MCNC: 83 MG/DL (ref 65–140)
HCT VFR BLD AUTO: 35.4 % (ref 36.5–49.3)
HGB BLD-MCNC: 11.1 G/DL (ref 12–17)
INR PPP: 3.63 (ref 0.84–1.19)
INTERVENTRICULAR SEPTUM IN DIASTOLE (PARASTERNAL SHORT AXIS VIEW): 1.3 CM
INTERVENTRICULAR SEPTUM: 1.3 CM (ref 0.6–1.1)
LAAS-AP2: 21.4 CM2
LAAS-AP4: 16.8 CM2
LEFT ATRIUM SIZE: 3.5 CM
LEFT INTERNAL DIMENSION IN SYSTOLE: 3.4 CM (ref 2.1–4)
LEFT VENTRICLE DIASTOLIC VOLUME (MOD BIPLANE): 87 ML
LEFT VENTRICLE SYSTOLIC VOLUME (MOD BIPLANE): 36 ML
LEFT VENTRICULAR INTERNAL DIMENSION IN DIASTOLE: 4.4 CM (ref 3.5–6)
LEFT VENTRICULAR POSTERIOR WALL IN END DIASTOLE: 1.1 CM
LEFT VENTRICULAR STROKE VOLUME: 39 ML
LV EF: 58 %
LVSV (TEICH): 39 ML
MCH RBC QN AUTO: 30.4 PG (ref 26.8–34.3)
MCHC RBC AUTO-ENTMCNC: 31.4 G/DL (ref 31.4–37.4)
MCV RBC AUTO: 97 FL (ref 82–98)
MITRAL REGURGITATION PEAK VELOCITY: 5.28 M/S
MITRAL VALVE MEAN INFLOW VELOCITY: 3.9 M/S
MITRAL VALVE REGURGITANT PEAK GRADIENT: 111 MMHG
MV E'TISSUE VEL-LAT: 11 CM/S
MV E'TISSUE VEL-SEP: 8 CM/S
MV PEAK A VEL: 0.56 M/S
MV PEAK E VEL: 79 CM/S
MV STENOSIS PRESSURE HALF TIME: 57 MS
MV VALVE AREA P 1/2 METHOD: 3.86 CM2
PLATELET # BLD AUTO: 182 THOUSANDS/UL (ref 149–390)
PMV BLD AUTO: 9.6 FL (ref 8.9–12.7)
POTASSIUM SERPL-SCNC: 3.3 MMOL/L (ref 3.5–5.3)
PROT SERPL-MCNC: 3.9 G/DL (ref 6.4–8.4)
PROTHROMBIN TIME: 36.2 SECONDS (ref 11.6–14.5)
PV PEAK GRADIENT: 3 MMHG
RBC # BLD AUTO: 3.65 MILLION/UL (ref 3.88–5.62)
RIGHT ATRIUM AREA SYSTOLE A4C: 26.6 CM2
RIGHT VENTRICLE ID DIMENSION: 4.7 CM
SL CV DOP CALC MV VTI RETROGRADE: 205 CM
SL CV LEFT ATRIUM LENGTH A2C: 5.1 CM
SL CV MV MEAN GRADIENT RETROGRADE: 72 MMHG
SL CV PED ECHO LEFT VENTRICLE DIASTOLIC VOLUME (MOD BIPLANE) 2D: 88 ML
SL CV PED ECHO LEFT VENTRICLE SYSTOLIC VOLUME (MOD BIPLANE) 2D: 48 ML
SODIUM SERPL-SCNC: 145 MMOL/L (ref 135–147)
TR MAX PG: 34 MMHG
TR PEAK VELOCITY: 2.9 M/S
TRICUSPID VALVE PEAK REGURGITATION VELOCITY: 2.93 M/S
TSH SERPL DL<=0.05 MIU/L-ACNC: 1.19 UIU/ML (ref 0.45–4.5)
WBC # BLD AUTO: 2.88 THOUSAND/UL (ref 4.31–10.16)

## 2022-12-28 RX ORDER — BENZONATATE 100 MG/1
100 CAPSULE ORAL 3 TIMES DAILY PRN
Status: DISCONTINUED | OUTPATIENT
Start: 2022-12-28 | End: 2022-12-29

## 2022-12-28 RX ADMIN — BUMETANIDE 2 MG: 0.25 INJECTION, SOLUTION INTRAMUSCULAR; INTRAVENOUS at 08:44

## 2022-12-28 RX ADMIN — SOTALOL HYDROCHLORIDE 160 MG: 80 TABLET ORAL at 17:02

## 2022-12-28 RX ADMIN — ATORVASTATIN CALCIUM 80 MG: 40 TABLET, FILM COATED ORAL at 08:44

## 2022-12-28 RX ADMIN — ISOSORBIDE MONONITRATE 60 MG: 60 TABLET, EXTENDED RELEASE ORAL at 08:44

## 2022-12-28 RX ADMIN — PANTOPRAZOLE SODIUM 40 MG: 40 TABLET, DELAYED RELEASE ORAL at 08:44

## 2022-12-28 RX ADMIN — BENZONATATE 100 MG: 100 CAPSULE ORAL at 23:20

## 2022-12-28 RX ADMIN — SOTALOL HYDROCHLORIDE 160 MG: 80 TABLET ORAL at 08:44

## 2022-12-28 RX ADMIN — BUMETANIDE 2 MG: 0.25 INJECTION, SOLUTION INTRAMUSCULAR; INTRAVENOUS at 17:03

## 2022-12-28 RX ADMIN — CLOPIDOGREL BISULFATE 75 MG: 75 TABLET ORAL at 08:44

## 2022-12-28 RX ADMIN — GUAIFENESIN 200 MG: 200 SOLUTION ORAL at 06:01

## 2022-12-28 NOTE — CASE MANAGEMENT
Case Management Progress Note    Patient name Virginia Hamlin  Location [address] [phone]/-[id number] MRN [id number]  : 1950 Date 2022       LOS (days): 1  Geometric Mean LOS (GMLOS) (days): 4 30  Days to GMLOS:3 4        OBJECTIVE:        Current admission status: Inpatient  Preferred Pharmacy:    [place] [id number]  Phone: [phone] Fax: [phone]    Primary Care Provider: Kristal Steel MD    Primary Insurance: MEDICARE  Secondary Insurance: [facility] [employer]    PROGRESS NOTE:      Pt has IR appointment at [facility], tomorrow, 22 at 0900 for a paracentesis   Cm placing transport request to [facility] in [address], requesting a [phone]

## 2022-12-28 NOTE — ASSESSMENT & PLAN NOTE
Secondary to CHF versus underlying malignancy or secondary to liver dysfunction  Asked critical care for paracentesis   Crit  Care recommends IR perform paracentesis and has consult place  Could Also be worsening small bowel obstruction  No known history of liver cirrhosis or hepatitis or alcohol abuse

## 2022-12-28 NOTE — ASSESSMENT & PLAN NOTE
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

## 2022-12-28 NOTE — ASSESSMENT & PLAN NOTE
Wt Readings from Last 3 Encounters:   12/28/22 85 1 kg (187 lb 11 2 oz)       Patient takes Bumex 2 mg daily on even days and 2 mg twice daily on days  Presents with lower extremity edema and ascites  Also has history of underlying malignancy with mesenteric mass which may also be contributing to ascites  Placed on bumex 2 mg IV twice daily and monitor electrolytes closely  Also asked critical care for paracentesis however they declined due to underlying mesenteric mass and will consult IR for paracentesis  Hold Coumadin for now INR 3 6  No 2D echo available in the system for review    Ordered 2D echo today which shows normal systolic function

## 2022-12-28 NOTE — PLAN OF CARE
Problem: RESPIRATORY - ADULT  Goal: Achieves optimal ventilation and oxygenation  Description: INTERVENTIONS:  - Assess for changes in respiratory status GAMA, SOB confusion  - Assess for changes in mentation and behavior  - Position to facilitate oxygenation and minimize respiratory effort  - Oxygen administered by appropriate delivery if ordered  - Initiate smoking cessation education as indicated  - Encourage broncho-pulmonary hygiene including cough, deep breathe, Incentive Spirometry  - Assess the need for suctioning and aspirate as needed  - Assess and instruct to report SOB or any respiratory difficulty  - Respiratory Therapy support as indicated  Outcome: Progressing

## 2022-12-28 NOTE — PROGRESS NOTES
Progress Note - General Surgery   Domonique Rae 71 y o  male MRN: [id number]  Unit/Bed#: -01 Encounter: [id number]    Assessment:  Small bowel obstruction  Umbilical Hernia containing bowel and ascites- reduced  Pain resolved, passing gas, moving bowels  Stage IV Lung cancer  Mesenteric mass  Ascites  Cirrhosis  CHF  Atrial fibrillation - on Warfarin  CAD - s/p PCI/Stent - on Plavix  KIRA  Anemia of Chronic   Malnutrition-Hypoalbuminemia    Plan:  - Conservative management  - Follow CBC and BMP  - Medicate PRN pain/nausea, minimize opioids  - Abdominal binder  - Patient would be high risk of mortality for surgical intervention due to extensive co-morbidities  - Ideally his umbilical hernia would be repaired at [facility] where he receives his cancer care and biopsy of the mesenteric mass could be entertained at that time    - Recommend a small bowel follow through series (gastrograffin challenge) today to ensure passage of contrast to the colon  - Pending gastrografin challenge may advance diet as tolerated later today  - Management of co-morbidities per primary team      Subjective/Objective     Subjective: No acute events  Underwent Echo this AM d/t heart failure history  Has tolerated clear liquids  Passing gas and moving bowels  Objective:  Blood pressure 105/67, pulse 65, temperature 98 4 °F (36 9 °C), resp  rate 16, height 5' 7" (1 702 m), weight 85 1 kg (187 lb 9 6 oz), SpO2 94 %  ,Body mass index is 29 38 kg/m²  Intake/Output Summary (Last 24 hours) at 12/28/2022 4157  Last data filed at 12/28/2022 0301  Gross per 24 hour   Intake 360 ml   Output 475 ml   Net -115 ml       Invasive Devices     Peripheral Intravenous Line  Duration           Peripheral IV 12/27/22 Distal;Right;Upper;Ventral (anterior) Arm <1 day                Physical Exam:  Gen: AxOx3  Heart: RRR  Lungs: decreased throughout  Abd: reducible umbilical hernia, bowel sounds present, non tender and non distended   No guarding or rebound    Lab, Imaging and other studies:  I have personally reviewed pertinent lab results      CBC:   Lab Results   Component Value Date    WBC 2 88 (L) 12/28/2022    HGB 11 1 (L) 12/28/2022    HCT 35 4 (L) 12/28/2022    MCV 97 12/28/2022     12/28/2022    MCH 30 4 12/28/2022    MCHC 31 4 12/28/2022    RDW 14 7 12/28/2022    MPV 9 6 12/28/2022    NRBC 0 12/27/2022     CMP:   Lab Results   Component Value Date    SODIUM 145 12/28/2022    K 3 3 (L) 12/28/2022     (H) 12/28/2022    CO2 27 12/28/2022    BUN 22 12/28/2022    CREATININE 1 20 12/28/2022    CALCIUM 7 7 (L) 12/28/2022    AST 36 12/28/2022    ALT 31 12/28/2022    ALKPHOS 77 12/28/2022    EGFR 60 12/28/2022     Coagulation:   Lab Results   Component Value Date    INR 3 63 (H) 12/28/2022     VTE Pharmacologic Prophylaxis: Plavix  VTE Mechanical Prophylaxis: sequential compression device       [facility]

## 2022-12-28 NOTE — NURSING NOTE
Patient provided med list from home not updated reviewed with patient/spouse meds actually taking and entered into epic

## 2022-12-28 NOTE — PROGRESS NOTES
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  Mesenteric vasculature distal to this mass is engorged as also described on outside reports  Nodular hepatic contours may indicate cirrhosis/hepatocellular disease    Large quantity of abdominal ascites  Moderate right and small left pleural effusions  he also had an umbilical hernia present on admission which was manually reduced by general surgery in the ED  Will place on a clear liquid diet and advance as tolerated after small bowel follow thru shows improvement    Acute on chronic diastolic CHF (congestive heart failure) (HCC)  Assessment & Plan  Wt Readings from Last 3 Encounters:   12/28/22 85 1 kg (187 lb 11 2 oz)       Patient takes Bumex 2 mg daily on even days and 2 mg twice daily on days  Presents with lower extremity edema and ascites  Also has history of underlying malignancy with mesenteric mass which may also be contributing to ascites  Placed on bumex 2 mg IV twice daily and monitor electrolytes closely  Also asked critical care for paracentesis however they declined due to underlying mesenteric mass and will consult IR for paracentesis  Hold Coumadin for now INR 3 6  No 2D echo available in the system for review    Ordered 2D echo today which shows normal systolic function      Lung cancer [facility])  Assessment & Plan  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, RLL  9/13/18-11/15/18: 4 cycles of carboplatin, pemetrexed and pembrolizumab  12/17/18-8/13/20: maintenance pemetrexed and pembrolizumb - stopped given hypoalbuminemia/pleural effusions/swelling after 2 years of treatment- now on observation  Also has known mesenteric mass  Follow up outpatient with heme-onc      Other ascites  Assessment & Plan  Secondary to CHF versus underlying malignancy or secondary to liver dysfunction  Asked critical care for paracentesis  Crit  Care recommends IR perform paracentesis and has consult place  Could Also be worsening small bowel obstruction  No known history of liver cirrhosis or hepatitis or alcohol abuse    Umbilical hernia without obstruction and without gangrene  Assessment & Plan  umbilical hernia without obstruction or gangrene present admission which was manually reduced by general surgery however will need outpatient follow-up      VTE Pharmacologic Prophylaxis:   Pharmacologic: Pharmacologic VTE Prophylaxis contraindicated due to inr 3 6  Mechanical VTE Prophylaxis in Place: Yes    Patient Centered Rounds: I have performed bedside rounds with nursing staff today  Discussions with Specialists or Other Care Team Provider: Discussed with surgery and critical care    Education and Discussions with Family / Patient: Discussed with patient at bedside and will update family    Time Spent for Care: 30 minutes  More than 50% of total time spent on counseling and coordination of care as described above  Current Length of Stay: 1 day(s)    Current Patient Status: Inpatient   Certification Statement: The patient will continue to require additional inpatient hospital stay due to small bowel obstruction    Discharge Plan: Anticipate discharge after 24 to 48 hours if small bowel obstruction improves    Code Status: Level 1 - Full Code      Subjective:   Patient states that he did have a small bowel movement last night and also passing flatus    Currently drinking contrast for small bowel follow-through    Objective:     Vitals:   Temp (24hrs), Av 1 °F (36 7 °C), Min:97 7 °F (36 5 °C), Max:98 4 °F (36 9 °C)    Temp:  [97 7 °F (36 5 °C)-98 4 °F (36 9 °C)] 98 1 °F (36 7 °C)  HR:  [54-65] 57  Resp:  [16-18] 18  BP: (105-112)/(60-67) 110/60  SpO2:  [88 %-97 %] 97 %  Body mass index is 29 4 kg/m²  Input and Output Summary (last 24 hours): Intake/Output Summary (Last 24 hours) at 2022 1344  Last data filed at 2022 0853  Gross per 24 hour   Intake 1080 ml   Output 475 ml   Net 605 ml       Physical Exam:     Physical Exam  Vitals and nursing note reviewed  Constitutional:       Appearance: Normal appearance  HENT:      Head: Normocephalic and atraumatic  Right Ear: External ear normal       Left Ear: External ear normal       Nose: Nose normal       Mouth/Throat:      Pharynx: Oropharynx is clear  Eyes:      Pupils: Pupils are equal, round, and reactive to light  Cardiovascular:      Rate and Rhythm: Normal rate and regular rhythm  Heart sounds: Normal heart sounds  Pulmonary:      Effort: Pulmonary effort is normal       Breath sounds: Normal breath sounds  Abdominal:      General: Bowel sounds are normal  There is distension  Palpations: Abdomen is soft  Tenderness: There is no abdominal tenderness  Hernia: A hernia is present  Musculoskeletal:         General: Normal range of motion  Cervical back: Normal range of motion and neck supple  Skin:     General: Skin is warm and dry  Capillary Refill: Capillary refill takes less than 2 seconds  Neurological:      General: No focal deficit present  Mental Status: He is alert and oriented to person, place, and time     Psychiatric:         Mood and Affect: Mood normal            Additional Data:     Labs:    Results from last 7 days   Lab Units 22  0606 22  1423   WBC Thousand/uL 2 88* 4 30*   HEMOGLOBIN g/dL 11 1* 11 8*   HEMATOCRIT % 35 4* 38 0   PLATELETS Thousands/uL 182 231   NEUTROS PCT %  --  68   LYMPHS PCT %  --  22   MONOS PCT %  --  7   EOS PCT %  --  1     Results from last 7 days   Lab Units 12/28/22  0606   SODIUM mmol/L 145   POTASSIUM mmol/L 3 3*   CHLORIDE mmol/L 109*   CO2 mmol/L 27   BUN mg/dL 22   CREATININE mg/dL 1 20   ANION GAP mmol/L 9   CALCIUM mg/dL 7 7*   ALBUMIN g/dL 1 3*   TOTAL BILIRUBIN mg/dL 0 28   ALK PHOS U/L 77   ALT U/L 31   AST U/L 36   GLUCOSE RANDOM mg/dL 83     Results from last 7 days   Lab Units 12/28/22  0606   INR  3 63*             Results from last 7 days   Lab Units 12/27/22  1423   LACTIC ACID mmol/L 1 1           * I Have Reviewed All Lab Data Listed Above  * Additional Pertinent Lab Tests Reviewed: All Labs For Current Hospital Admission Reviewed    Imaging:    Imaging Reports Reviewed Today Include: 2D echo  Imaging Personally Reviewed by Myself Includes: Await small bowel follow-through    Recent Cultures (last 7 days):           Last 24 Hours Medication List:   Current Facility-Administered Medications   Medication Dose Route Frequency Provider Last Rate   • acetaminophen  650 mg Oral Q6H PRN Isaias Rangel MD     • atorvastatin  80 mg Oral Daily Isaias Rangel MD     • bumetanide  2 mg Intravenous BID Isaias Rangel MD     • clopidogrel  75 mg Oral Daily Isaias Rangel MD     • fentanyl citrate (PF)  75 mcg Intravenous Once Manuel Sullivan DO     • guaiFENesin  200 mg Oral Q4H PRN MARIO Nicole     • isosorbide mononitrate  60 mg Oral Daily Isaias Rangel MD     • ondansetron  4 mg Intravenous Q6H PRN Isaias Rangel MD     • pantoprazole  40 mg Oral Daily Isaias Rangel MD     • sotalol  160 mg Oral BID Isaias Rangel MD          Today, Patient Was Seen By: Isaias Rangel MD    ** Please Note: Dictation voice to text software may have been used in the creation of this document   **

## 2022-12-29 ENCOUNTER — APPOINTMENT (OUTPATIENT)
Dept: PHYSICAL THERAPY | Facility: CLINIC | Age: 72
End: 2022-12-29

## 2022-12-29 PROBLEM — E87.6 HYPOKALEMIA: Status: ACTIVE | Noted: 2022-12-29

## 2022-12-29 PROBLEM — K42.0 UMBILICAL HERNIA WITH OBSTRUCTION: Status: ACTIVE | Noted: 2022-12-27

## 2022-12-29 LAB
ANION GAP SERPL CALCULATED.3IONS-SCNC: 7 MMOL/L (ref 4–13)
ANION GAP SERPL CALCULATED.3IONS-SCNC: 7 MMOL/L (ref 4–13)
BUN SERPL-MCNC: 17 MG/DL (ref 5–25)
BUN SERPL-MCNC: 18 MG/DL (ref 5–25)
CALCIUM SERPL-MCNC: 7.4 MG/DL (ref 8.3–10.1)
CALCIUM SERPL-MCNC: 7.5 MG/DL (ref 8.3–10.1)
CHLORIDE SERPL-SCNC: 111 MMOL/L (ref 96–108)
CHLORIDE SERPL-SCNC: 111 MMOL/L (ref 96–108)
CO2 SERPL-SCNC: 26 MMOL/L (ref 21–32)
CO2 SERPL-SCNC: 27 MMOL/L (ref 21–32)
CREAT SERPL-MCNC: 1.18 MG/DL (ref 0.6–1.3)
CREAT SERPL-MCNC: 1.34 MG/DL (ref 0.6–1.3)
GFR SERPL CREATININE-BSD FRML MDRD: 52 ML/MIN/1.73SQ M
GFR SERPL CREATININE-BSD FRML MDRD: 61 ML/MIN/1.73SQ M
GLUCOSE SERPL-MCNC: 128 MG/DL (ref 65–140)
GLUCOSE SERPL-MCNC: 96 MG/DL (ref 65–140)
INR PPP: 4.25 (ref 0.84–1.19)
MAGNESIUM SERPL-MCNC: 1.5 MG/DL (ref 1.6–2.6)
POTASSIUM SERPL-SCNC: 2.9 MMOL/L (ref 3.5–5.3)
POTASSIUM SERPL-SCNC: 3.5 MMOL/L (ref 3.5–5.3)
PROTHROMBIN TIME: 40.8 SECONDS (ref 11.6–14.5)
SODIUM SERPL-SCNC: 144 MMOL/L (ref 135–147)
SODIUM SERPL-SCNC: 145 MMOL/L (ref 135–147)

## 2022-12-29 RX ORDER — SODIUM CHLORIDE 9 MG/ML
50 INJECTION, SOLUTION INTRAVENOUS CONTINUOUS
Status: DISCONTINUED | OUTPATIENT
Start: 2022-12-29 | End: 2022-12-29

## 2022-12-29 RX ORDER — MAGNESIUM SULFATE HEPTAHYDRATE 40 MG/ML
2 INJECTION, SOLUTION INTRAVENOUS ONCE
Status: COMPLETED | OUTPATIENT
Start: 2022-12-29 | End: 2022-12-29

## 2022-12-29 RX ORDER — BUMETANIDE 1 MG/1
2 TABLET ORAL 2 TIMES DAILY
Status: DISCONTINUED | OUTPATIENT
Start: 2022-12-29 | End: 2022-12-30 | Stop reason: HOSPADM

## 2022-12-29 RX ORDER — BUMETANIDE 1 MG/1
2 TABLET ORAL
Status: DISCONTINUED | OUTPATIENT
Start: 2022-12-29 | End: 2022-12-29

## 2022-12-29 RX ORDER — SPIRONOLACTONE 25 MG/1
25 TABLET ORAL DAILY
Status: DISCONTINUED | OUTPATIENT
Start: 2022-12-29 | End: 2022-12-30 | Stop reason: HOSPADM

## 2022-12-29 RX ORDER — POTASSIUM CHLORIDE 14.9 MG/ML
20 INJECTION INTRAVENOUS
Status: DISPENSED | OUTPATIENT
Start: 2022-12-29 | End: 2022-12-29

## 2022-12-29 RX ORDER — BUMETANIDE 1 MG/1
2 TABLET ORAL DAILY
Status: DISCONTINUED | OUTPATIENT
Start: 2022-12-29 | End: 2022-12-29

## 2022-12-29 RX ORDER — POTASSIUM CHLORIDE 20 MEQ/1
40 TABLET, EXTENDED RELEASE ORAL ONCE
Status: COMPLETED | OUTPATIENT
Start: 2022-12-29 | End: 2022-12-29

## 2022-12-29 RX ADMIN — POTASSIUM CHLORIDE 20 MEQ: 14.9 INJECTION, SOLUTION INTRAVENOUS at 07:41

## 2022-12-29 RX ADMIN — ISOSORBIDE MONONITRATE 60 MG: 60 TABLET, EXTENDED RELEASE ORAL at 09:30

## 2022-12-29 RX ADMIN — SOTALOL HYDROCHLORIDE 160 MG: 80 TABLET ORAL at 17:08

## 2022-12-29 RX ADMIN — MAGNESIUM SULFATE HEPTAHYDRATE 2 G: 40 INJECTION, SOLUTION INTRAVENOUS at 10:54

## 2022-12-29 RX ADMIN — SOTALOL HYDROCHLORIDE 160 MG: 80 TABLET ORAL at 09:30

## 2022-12-29 RX ADMIN — Medication 400 MG: at 17:08

## 2022-12-29 RX ADMIN — PANTOPRAZOLE SODIUM 40 MG: 40 TABLET, DELAYED RELEASE ORAL at 09:30

## 2022-12-29 RX ADMIN — Medication 400 MG: at 09:30

## 2022-12-29 RX ADMIN — PHENOL 1 SPRAY: 1.4 SPRAY ORAL at 13:55

## 2022-12-29 RX ADMIN — SODIUM CHLORIDE 50 ML/HR: 0.9 INJECTION, SOLUTION INTRAVENOUS at 07:41

## 2022-12-29 RX ADMIN — SPIRONOLACTONE 25 MG: 25 TABLET ORAL at 13:55

## 2022-12-29 RX ADMIN — ATORVASTATIN CALCIUM 80 MG: 40 TABLET, FILM COATED ORAL at 09:30

## 2022-12-29 RX ADMIN — PANCRELIPASE 36000 UNITS: 24000; 76000; 120000 CAPSULE, DELAYED RELEASE PELLETS ORAL at 17:08

## 2022-12-29 RX ADMIN — BUMETANIDE 2 MG: 1 TABLET ORAL at 17:08

## 2022-12-29 RX ADMIN — POTASSIUM CHLORIDE 40 MEQ: 1500 TABLET, EXTENDED RELEASE ORAL at 09:30

## 2022-12-29 RX ADMIN — BUMETANIDE 2 MG: 1 TABLET ORAL at 13:54

## 2022-12-29 NOTE — PROGRESS NOTES
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  Mesenteric vasculature distal to this mass is engorged as also described on outside reports  Nodular hepatic contours may indicate cirrhosis/hepatocellular disease    Large quantity of abdominal ascites  Moderate right and small left pleural effusions  he also had an umbilical hernia present on admission which was manually reduced by general surgery in the ED   small bowel follow-through shows improvement and hence placed on soft diet and observe for now    Acute on chronic diastolic CHF (congestive heart failure) (HCC)  Assessment & Plan  Wt Readings from Last 3 Encounters:   12/29/22 84 6 kg (186 lb 9 6 oz)       Patient takes Bumex 2 mg daily on even days and 2 mg twice daily on days  Presents with lower extremity edema and ascites  Also has history of underlying malignancy with mesenteric mass which may also be contributing to ascites  Placed on bumex 2 mg  twice daily and monitor electrolytes closely  Also asked critical care for paracentesis however they declined due to underlying mesenteric mass and will consult IR for paracentesis  Hold Coumadin for now INR 4   2D echo this admission which shows normal systolic function      Hypokalemia  Assessment & Plan  K 2 9 and noted to have magnesium of 1 5    We will replace both potassium and magnesium and recheck labs again today    Lung cancer [facility])  Assessment & Plan  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, RLL  9/13/18-11/15/18: 4 cycles of carboplatin, pemetrexed and pembrolizumab  12/17/18-8/13/20: maintenance pemetrexed and pembrolizumb - stopped given hypoalbuminemia/pleural effusions/swelling after 2 years of treatment- now on observation  Also has known mesenteric mass  Follow up outpatient with heme-onc      Other ascites  Assessment & Plan  Secondary to CHF versus underlying malignancy or secondary to liver dysfunction  Asked critical care for paracentesis  Crit  Care recommends IR perform paracentesis and plan for that to be done  cont Bumex increase to 2 mg oral twice daily  Add Aldactone 25 mg daily and try to gradually titrate up  No known history of liver cirrhosis or hepatitis or alcohol abuse    Umbilical hernia with obstruction  Assessment & Plan  umbilical hernia without obstruction or gangrene present admission which was manually reduced by general surgery however will need outpatient follow-up    VTE Pharmacologic Prophylaxis:   Pharmacologic: Pharmacologic VTE Prophylaxis contraindicated due to ELEVATED INR  Mechanical VTE Prophylaxis in Place: Yes    Patient Centered Rounds: I have performed bedside rounds with nursing staff today  Discussions with Specialists or Other Care Team Provider: Discussed with surgery    Education and Discussions with Family / Patient: Patient at bedside and will update wife    Time Spent for Care: 45 minutes  More than 50% of total time spent on counseling and coordination of care as described above      Current Length of Stay: 2 day(s)    Current Patient Status: Inpatient   Certification Statement: The patient will continue to require additional inpatient hospital stay due to HYPOKALEMIA    Discharge Plan: Await discharge tomorrow after paracentesis is done    Code Status: Level 1 - Full Code      Subjective:   Patient denies any chest pain or shortness of breath complains of bowel distention however he states he had some bowel movements and passing flatus and feels a little better and wants to try some solid food    Objective:     Vitals:   Temp (24hrs), Av 1 °F (36 7 °C), Min:97 9 °F (36 6 °C), Max:98 2 °F (36 8 °C)    Temp:  [97 9 °F (36 6 °C)-98 2 °F (36 8 °C)] 97 9 °F (36 6 °C)  HR:  [57-62] 61  Resp:  [15-19] 19  BP: (117-119)/(62-66) 119/65  SpO2:  [92 %-95 %] 95 %  Body mass index is 29 23 kg/m²  Input and Output Summary (last 24 hours): Intake/Output Summary (Last 24 hours) at 2022 1228  Last data filed at 2022 0900  Gross per 24 hour   Intake 1560 ml   Output --   Net 1560 ml       Physical Exam:     Physical Exam  Vitals and nursing note reviewed  Constitutional:       Appearance: Normal appearance  HENT:      Head: Normocephalic and atraumatic  Right Ear: External ear normal       Left Ear: External ear normal       Nose: Nose normal       Mouth/Throat:      Pharynx: Oropharynx is clear  Eyes:      Pupils: Pupils are equal, round, and reactive to light  Cardiovascular:      Rate and Rhythm: Normal rate and regular rhythm  Heart sounds: Normal heart sounds  Pulmonary:      Effort: Pulmonary effort is normal       Breath sounds: Normal breath sounds  Abdominal:      General: Bowel sounds are normal  There is distension  Palpations: Abdomen is soft  Tenderness: There is no abdominal tenderness  Musculoskeletal:         General: Normal range of motion  Cervical back: Normal range of motion and neck supple  Skin:     General: Skin is warm and dry  Capillary Refill: Capillary refill takes less than 2 seconds  Neurological:      General: No focal deficit present  Mental Status: He is alert and oriented to person, place, and time     Psychiatric:         Mood and Affect: Mood normal            Additional Data:     Labs:    Results from last 7 days   Lab Units 22  0606 22  1423   WBC Thousand/uL 2 88* 4 30*   HEMOGLOBIN g/dL 11 1* 11 8*   HEMATOCRIT % 35 4* 38 0   PLATELETS Thousands/uL 182 231   NEUTROS PCT %  --  68   LYMPHS PCT %  --  22   MONOS PCT %  --  7   EOS PCT %  --  1     Results from last 7 days   Lab Units 12/29/22  0444 12/28/22  0606   SODIUM mmol/L 145 145   POTASSIUM mmol/L 2 9* 3 3*   CHLORIDE mmol/L 111* 109*   CO2 mmol/L 27 27   BUN mg/dL 17 22   CREATININE mg/dL 1 18 1 20   ANION GAP mmol/L 7 9   CALCIUM mg/dL 7 4* 7 7*   ALBUMIN g/dL  --  1 3*   TOTAL BILIRUBIN mg/dL  --  0 28   ALK PHOS U/L  --  77   ALT U/L  --  31   AST U/L  --  36   GLUCOSE RANDOM mg/dL 96 83     Results from last 7 days   Lab Units 12/29/22  0958   INR  4 25*             Results from last 7 days   Lab Units 12/27/22  1423   LACTIC ACID mmol/L 1 1           * I Have Reviewed All Lab Data Listed Above  * Additional Pertinent Lab Tests Reviewed:  Kristian Matthew Admission Reviewed    Imaging:    Imaging Reports Reviewed Today Include: X-ray KUB  Imaging Personally Reviewed by Myself Includes: XRAY KUB    Recent Cultures (last 7 days):           Last 24 Hours Medication List:   Current Facility-Administered Medications   Medication Dose Route Frequency Provider Last Rate   • acetaminophen  650 mg Oral Q6H PRN Donna Savage MD     • atorvastatin  80 mg Oral Daily Donna Savage MD     • benzonatate  100 mg Oral TID PRN RJ DubonNP     • bumetanide  2 mg Oral BID Donna Savage MD     • clopidogrel  75 mg Oral Daily Donna Savage MD     • fentanyl citrate (PF)  75 mcg Intravenous Once Gely Siddiqi DO     • guaiFENesin  200 mg Oral Q4H PRN MARIO Rollins     • isosorbide mononitrate  60 mg Oral Daily Donna Savage MD     • magnesium oxide  400 mg Oral BID Donna Savage MD     • magnesium sulfate  2 g Intravenous Once Donna Savage MD     • ondansetron  4 mg Intravenous Q6H PRN Donna Savage MD     • pantoprazole  40 mg Oral Daily Donna Savage MD     • sotalol  160 mg Oral BID Donna Savage MD     • spironolactone  25 mg Oral Daily Donna Savage MD          Today, Patient Was Seen By: Quentin Block MD    ** Please Note: Dictation voice to text software may have been used in the creation of this document   **

## 2022-12-29 NOTE — PROGRESS NOTES
Progress Note - General Surgery   Michelle Cardenas 71 y o  male MRN: [id number]  Unit/Bed#: -01 Encounter: [id number]    Assessment:  71 y o  male with SBO, umbilical hernia containing bowel and ascites - reduced  Gastrografin challenge performed yesterday: IMPRESSION:  - No bowel obstruction  Contrast material has passed into the large bowel  Several minimally dilated small bowel loops are seen in the mid abdomen     - Pain resolved, passing gas, significant diarrhea throughout the day yesterday with tolerance of clear liquids   - Afebrile, VSS on room air  - No new CBC - Hgb and WBC have been stable  - Hypokalemia - 2 9 (3 3, 3 8)  - Cr - 1 18 (1 20, 1 39, 1 38), did not meet criteria for KIRA  - Influenza positive  - Stage IV Lung cancer, Mesenteric mass  - Hx Cirrhosis, Ascites, CHF, Atrial fibrillation - on Warfarin, CAD - s/p PCI/Stent - on Plavix  - Malnutrition-Hypoalbuminemia     Plan:  - Conservative management  - Advanced to surgical soft diet - patient was made NPO at midnight despite tolerating clear liquids, questioned possibly for procedure, appears order was in error - discussed with SLIM who agrees with diet order  - Medicate PRN pain/nausea, minimize opioids  - Abdominal binder  - Patient would be high risk of mortality for surgical intervention due to extensive co-morbidities  - Ideally his umbilical hernia would be repaired at [facility] where he receives his cancer care and biopsy of the mesenteric mass could be entertained at that time  - IR procedure has been pushed from 0900 to this afternoon - paracentesis  - Electrolyte repletion per medicine team   - Management of co-morbidities per primary team    Subjective:   Patient doing well  He denies any abdominal pain, nausea, or vomiting  Patient has some frustration with lack of communication in regard to procedures performed and those being performed  Discussed results of gastrografin challenge with patient   He questions procedure for today - explained paracentesis and appointment for 0900 at [facility] (nursing informing him of change to afternoon)  Patient also questioning when he may have a normal diet and is aware from surgical standpoint he may be advanced but will need to discuss with medicine team based on procedure and current order for NPO at midnight  Patient did tolerate clear liquids yesterday without issue and had "9 hours of diarrhea " Apologized for lack of prior communication in regard to testing being done and patient without further questions at this time  Objective:   Blood pressure 119/65, pulse 61, temperature 97 9 °F (36 6 °C), resp  rate 19, height 5' 7" (1 702 m), weight 84 6 kg (186 lb 9 6 oz), SpO2 95 %  ,Body mass index is 29 23 kg/m²  Intake/Output Summary (Last 24 hours) at 12/29/2022 2483  Last data filed at 12/28/2022 2321  Gross per 24 hour   Intake 1800 ml   Output --   Net 1800 ml     Invasive Devices     Peripheral Intravenous Line  Duration           Peripheral IV 12/27/22 Distal;Right;Upper;Ventral (anterior) Arm 1 day              Physical Exam:  General: no acute distress, pt appears well and comfortable, awake in bed  Skin: warm and dry to touch  Pulmonary: normal effort  Abdominal: abdominal binder in place - taken down for examination - soft, non-distended, non-tender to palpation, umbilical hernia reduced - no skin changes or tenderness, no guarding or rebound  Neuro: alert and oriented     Lab, Imaging and other studies:  I have personally reviewed pertinent lab results    , CBC: No results found for: WBC, HGB, HCT, MCV, PLT, ADJUSTEDWBC, MCH, MCHC, RDW, MPV, NRBC, CMP:   Lab Results   Component Value Date    SODIUM 145 12/29/2022    K 2 9 (L) 12/29/2022     (H) 12/29/2022    CO2 27 12/29/2022    BUN 17 12/29/2022    CREATININE 1 18 12/29/2022    CALCIUM 7 4 (L) 12/29/2022    EGFR 61 12/29/2022     VTE Pharmacologic Prophylaxis: Plavix  VTE Mechanical Prophylaxis: sequential compression device    Katrina Everitt Costa Billig  12/29/2022

## 2022-12-29 NOTE — ASSESSMENT & PLAN NOTE
K 2 9 and noted to have magnesium of 1 5    We will replace both potassium and magnesium and recheck labs again today

## 2022-12-29 NOTE — CASE MANAGEMENT
Case Management Progress Note    Patient name Deirdre Rizvi  Location [address] 316/-78 MRN [id number]  : 1950 Date 2022       LOS (days): 2  Geometric Mean LOS (GMLOS) (days): 4 30  Days to GMLOS:2 6        OBJECTIVE:        Current admission status: Inpatient  Preferred Pharmacy:    [place] [address] [place] [address] [address]  [address] [place] [address]  Phone: [phone] Fax: [phone]    Primary Care Provider: Katerina Hughes MD    Primary Insurance: MEDICARE  Secondary Insurance: [facility] HEALTH OPTIONS PROGRAM    PROGRESS NOTE:    Patient was scheduled for Paracenthesis for today at [address] for 0900 today  [employer] was unable to secure transportation for 0800 and was unable to secure transport prior to 1530 today  CM spoke with provider and wants the IR procedure rescheduled for  tomorrow  CM updated IR team via [employer]  TOM spoke with Padmini Ledesma at [facility] and she stated Oliver Rodriguez has availability tomorrow am and will update date CM when time is confirmed  Transport request updated in [facility]  Confirmation of  time for transport to [facility] pending and then acceptance from [facility]  schedule pending

## 2022-12-29 NOTE — ASSESSMENT & PLAN NOTE
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

## 2022-12-29 NOTE — ASSESSMENT & PLAN NOTE
Secondary to CHF versus underlying malignancy or secondary to liver dysfunction  Asked critical care for paracentesis  Crit  Care recommends IR perform paracentesis and plan for that to be done  cont Bumex increase to 2 mg oral twice daily    Add Aldactone 25 mg daily and try to gradually titrate up  No known history of liver cirrhosis or hepatitis or alcohol abuse

## 2022-12-29 NOTE — CASE MANAGEMENT
Case Management Progress Note    Patient name Jovanna Adhikari  Location [address] 316/-33 MRN [id number]  : 1950 Date 2022       LOS (days): 2  Geometric Mean LOS (GMLOS) (days): 4 30  Days to GMLOS:2 4        OBJECTIVE:        Current admission status: Inpatient  Preferred Pharmacy:    [address]  Phone: [phone] Fax: [phone]    Primary Care Provider: Genoveva Sharma MD    Primary Insurance: MEDICARE  Secondary Insurance: [facility] HEALTH OPTIONS PROGRAM    PROGRESS NOTE:    Change in IR plan  Patient is going to [facility]  time for 0800 confirmed [facility] transport and with [facility] that time is good for Paracentensis  Transport to stay with patient and return to [address] [place] [address]  Provider and Nursing aware of same  Medical Necessity for transportation was completed  Copy available for transport team along with face shet  Copy in patient chart       CM to follow

## 2022-12-29 NOTE — ASSESSMENT & PLAN NOTE
Wt Readings from Last 3 Encounters:   12/29/22 84 6 kg (186 lb 9 6 oz)       Patient takes Bumex 2 mg daily on even days and 2 mg twice daily on days  Presents with lower extremity edema and ascites  Also has history of underlying malignancy with mesenteric mass which may also be contributing to ascites  Placed on bumex 2 mg  twice daily and monitor electrolytes closely    Also asked critical care for paracentesis however they declined due to underlying mesenteric mass and will consult IR for paracentesis  Hold Coumadin for now INR 4   2D echo this admission which shows normal systolic function

## 2022-12-30 ENCOUNTER — TELEPHONE (OUTPATIENT)
Dept: INTERVENTIONAL RADIOLOGY/VASCULAR | Facility: HOSPITAL | Age: 72
End: 2022-12-30

## 2022-12-30 VITALS
SYSTOLIC BLOOD PRESSURE: 139 MMHG | HEART RATE: 61 BPM | OXYGEN SATURATION: 97 % | WEIGHT: 185.2 LBS | BODY MASS INDEX: 29.07 KG/M2 | DIASTOLIC BLOOD PRESSURE: 76 MMHG | TEMPERATURE: 97.3 F | RESPIRATION RATE: 19 BRPM | HEIGHT: 67 IN

## 2022-12-30 PROBLEM — I50.33 ACUTE ON CHRONIC DIASTOLIC CHF (CONGESTIVE HEART FAILURE) (HCC): Status: RESOLVED | Noted: 2022-12-27 | Resolved: 2022-12-30

## 2022-12-30 PROBLEM — J10.1 INFLUENZA A: Status: ACTIVE | Noted: 2022-12-30

## 2022-12-30 PROBLEM — K42.0 UMBILICAL HERNIA WITH OBSTRUCTION: Status: RESOLVED | Noted: 2022-12-27 | Resolved: 2022-12-30

## 2022-12-30 PROBLEM — K56.609 SBO (SMALL BOWEL OBSTRUCTION) (HCC): Status: RESOLVED | Noted: 2022-12-27 | Resolved: 2022-12-30

## 2022-12-30 LAB
ANION GAP SERPL CALCULATED.3IONS-SCNC: 8 MMOL/L (ref 4–13)
BUN SERPL-MCNC: 19 MG/DL (ref 5–25)
CALCIUM SERPL-MCNC: 7.3 MG/DL (ref 8.3–10.1)
CHLORIDE SERPL-SCNC: 111 MMOL/L (ref 96–108)
CO2 SERPL-SCNC: 26 MMOL/L (ref 21–32)
CREAT SERPL-MCNC: 1.3 MG/DL (ref 0.6–1.3)
GFR SERPL CREATININE-BSD FRML MDRD: 54 ML/MIN/1.73SQ M
GLUCOSE SERPL-MCNC: 123 MG/DL (ref 65–140)
INR PPP: 3.74 (ref 0.84–1.19)
MAGNESIUM SERPL-MCNC: 1.6 MG/DL (ref 1.6–2.6)
POTASSIUM SERPL-SCNC: 3.4 MMOL/L (ref 3.5–5.3)
PROTHROMBIN TIME: 37 SECONDS (ref 11.6–14.5)
SODIUM SERPL-SCNC: 145 MMOL/L (ref 135–147)

## 2022-12-30 RX ORDER — BUMETANIDE 2 MG/1
2 TABLET ORAL 2 TIMES DAILY
Qty: 60 TABLET | Refills: 0 | Status: SHIPPED | OUTPATIENT
Start: 2022-12-30

## 2022-12-30 RX ORDER — SPIRONOLACTONE 25 MG/1
25 TABLET ORAL DAILY
Qty: 30 TABLET | Refills: 0 | Status: SHIPPED | OUTPATIENT
Start: 2022-12-31

## 2022-12-30 RX ORDER — POTASSIUM CHLORIDE 750 MG/1
10 CAPSULE, EXTENDED RELEASE ORAL DAILY
Qty: 30 CAPSULE | Refills: 0 | Status: SHIPPED | OUTPATIENT
Start: 2022-12-30

## 2022-12-30 RX ORDER — POTASSIUM CHLORIDE 20 MEQ/1
40 TABLET, EXTENDED RELEASE ORAL ONCE
Status: COMPLETED | OUTPATIENT
Start: 2022-12-30 | End: 2022-12-30

## 2022-12-30 RX ADMIN — SOTALOL HYDROCHLORIDE 160 MG: 80 TABLET ORAL at 08:01

## 2022-12-30 RX ADMIN — PANCRELIPASE 36000 UNITS: 24000; 76000; 120000 CAPSULE, DELAYED RELEASE PELLETS ORAL at 08:01

## 2022-12-30 RX ADMIN — BUMETANIDE 2 MG: 1 TABLET ORAL at 08:00

## 2022-12-30 RX ADMIN — POTASSIUM CHLORIDE 40 MEQ: 1500 TABLET, EXTENDED RELEASE ORAL at 11:32

## 2022-12-30 RX ADMIN — ISOSORBIDE MONONITRATE 60 MG: 60 TABLET, EXTENDED RELEASE ORAL at 08:01

## 2022-12-30 RX ADMIN — PANCRELIPASE 36000 UNITS: 24000; 76000; 120000 CAPSULE, DELAYED RELEASE PELLETS ORAL at 11:32

## 2022-12-30 RX ADMIN — Medication 400 MG: at 08:01

## 2022-12-30 RX ADMIN — ATORVASTATIN CALCIUM 80 MG: 40 TABLET, FILM COATED ORAL at 08:01

## 2022-12-30 RX ADMIN — SPIRONOLACTONE 25 MG: 25 TABLET ORAL at 08:01

## 2022-12-30 RX ADMIN — PANTOPRAZOLE SODIUM 40 MG: 40 TABLET, DELAYED RELEASE ORAL at 08:01

## 2022-12-30 NOTE — NURSING NOTE
IV sites removed  Discharge instructions reviewed with patient and wife at bed side  All questions and concerns were addressed

## 2022-12-30 NOTE — DISCHARGE INSTR - AVS FIRST PAGE
IR paracentesis scheduled for Monday January 2nd  Do not take coumadin and plavix until after the paracentesis is done  Restart these medications after paracentesis   Follow up with Dr Aj White regarding your INR and monitor outpatient  Follow up within 1 week  Follow up with general surgery regarding hernia  Follow up with oncology as scheduled  Repeat labs in 1 week

## 2022-12-30 NOTE — ASSESSMENT & PLAN NOTE
Patient presented with sbo on admission  Ct abd Small bowel obstruction with transition point located within an umbilical hernia containing a short segment of mid small bowel  Reidentified known approximate 5 cm left mesenteric mass  Mesenteric vasculature distal to this mass is engorged as also described on outside reports  Nodular hepatic contours may indicate cirrhosis/hepatocellular disease    Large quantity of abdominal ascites  Moderate right and small left pleural effusions  he also had an umbilical hernia present on admission which was manually reduced by general surgery in the ED   small bowel follow-through shows improvement and hence placed on soft diet and observe for now    Resolved - patient tolerating diet, passing flatus, and eating well

## 2022-12-30 NOTE — PROGRESS NOTES
Progress Note - General Surgery   Cookie Breed 71 y o  male MRN: [id number]  Unit/Bed#: -01 Encounter: [id number]    Assessment:  - SBO resolved  - Reducible umbilical hernia  - Tolerating surgical soft diet  - Afebrile, VSS on room air  - Hgb and WBC have been stable  - Hypokalemia : 3 4 (3 5, 2 9, 3 3, 3 8)  - Cr: 1 30 (1 34, 1 18, 1 20, 1 39, 1 38), did not meet criteria for KIRA  - Influenza positive  - Stage IV Lung cancer, Mesenteric mass  - Hx Cirrhosis, Ascites, CHF, Atrial fibrillation - on Warfarin, CAD - s/p PCI/Stent - on Plavix  - Malnutrition-Hypoalbuminemia    Plan:  - Diet as tolerated  - IR today for paracentesis  - Medicate PRN pain/nausea, minimize opioids  - Abdominal binder  - Patient would be high risk of mortality for surgical intervention due to extensive co-morbidities  - Ideally his umbilical hernia would be repaired at [place] where he receives his cancer care and biopsy of the mesenteric mass could be entertained at that time  - IR procedure has been pushed from 0900 to this afternoon - paracentesis  - Electrolyte repletion per medicine team   - Management of co-morbidities per primary team  - General surgery will sign off at this time, SBO has resolved  We are available to reengage as needed  - Discharge when medically appropriate      Subjective/Objective     Subjective: No acute events  Tolerating diet  Passing gas and moving bowels    Objective:   Blood pressure 135/83, pulse 56, temperature 97 5 °F (36 4 °C), resp  rate 18, height 5' 7" (1 702 m), weight 84 kg (185 lb 3 2 oz), SpO2 96 %  ,Body mass index is 29 01 kg/m²        Intake/Output Summary (Last 24 hours) at 12/30/2022 0730  Last data filed at 12/29/2022 1804  Gross per 24 hour   Intake 840 ml   Output --   Net 840 ml       Invasive Devices     Peripheral Intravenous Line  Duration           Peripheral IV 12/27/22 Distal;Right;Upper;Ventral (anterior) Arm 2 days                Physical Exam:   General: AxOx3  Abd: soft, non tender, non distended, reducible umbilical hernia, no skin changes, no guarding or rebound    Lab, Imaging and other studies:  I have personally reviewed pertinent lab results    , CBC: No results found for: WBC, HGB, HCT, MCV, PLT, ADJUSTEDWBC, MCH, MCHC, RDW, MPV, NRBC,   CMP:   Lab Results   Component Value Date    SODIUM 145 12/30/2022    K 3 4 (L) 12/30/2022     (H) 12/30/2022    CO2 26 12/30/2022    BUN 19 12/30/2022    CREATININE 1 30 12/30/2022    CALCIUM 7 3 (L) 12/30/2022    EGFR 54 12/30/2022     VTE Pharmacologic Prophylaxis: Plavix  VTE Mechanical Prophylaxis:  sequential compression device       [facility]

## 2022-12-30 NOTE — ASSESSMENT & PLAN NOTE
K 2 9 and noted to have magnesium of 1 5  We will replace both potassium and magnesium and recheck labs again today  Potassium 3 4, replete and follow up outpatient

## 2022-12-30 NOTE — ASSESSMENT & PLAN NOTE
Patient noted to be influenza A positive on 12/23 through [employer]  Patient not having any symptoms at this time  Symptomatic monitoring at home

## 2022-12-30 NOTE — ASSESSMENT & PLAN NOTE
Secondary to CHF versus underlying malignancy or secondary to liver dysfunction  Asked critical care for paracentesis  Crit  Care recommends IR perform paracentesis and plan for that to be done  cont Bumex increase to 2 mg oral twice daily  Add Aldactone 25 mg daily and try to gradually titrate up  No known history of liver cirrhosis or hepatitis or alcohol abuse  IR paracentesis was to be done on 12/30, patient was sent back to him room due to INR of 3 74  Patient to receive paracentesis Monday  Will hold coumadin until then

## 2022-12-30 NOTE — DISCHARGE SUMMARY
[address]  Discharge- Domonique Glance 1950, 71 y o  male MRN: [id number]  Unit/Bed#: -[place] Encounter: [id number]  Primary Care Provider: Nadine Dominguez MD   Date and time admitted to hospital: 12/27/2022  1:43 PM    * SBO (small bowel obstruction) [facility])  Assessment & Plan  Patient presented with sbo on admission  Ct abd Small bowel obstruction with transition point located within an umbilical hernia containing a short segment of mid small bowel  Reidentified known approximate 5 cm left mesenteric mass  Mesenteric vasculature distal to this mass is engorged as also described on outside reports  Nodular hepatic contours may indicate cirrhosis/hepatocellular disease    Large quantity of abdominal ascites  Moderate right and small left pleural effusions  he also had an umbilical hernia present on admission which was manually reduced by general surgery in the ED   small bowel follow-through shows improvement and hence placed on soft diet and observe for now    Resolved - patient tolerating diet, passing flatus, and eating well  Influenza A  Assessment & Plan  Patient noted to be influenza A positive on 12/23 through [employer]  Patient not having any symptoms at this time  Symptomatic monitoring at home  Hypokalemia  Assessment & Plan  K 2 9 and noted to have magnesium of 1 5  We will replace both potassium and magnesium and recheck labs again today  Potassium 3 4, replete and follow up outpatient       Lung cancer [facility])  Assessment & Plan  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, RLL  9/13/18-11/15/18: 4 cycles of carboplatin, pemetrexed and pembrolizumab  12/17/18-8/13/20: maintenance pemetrexed and pembrolizumb - stopped given hypoalbuminemia/pleural effusions/swelling after 2 years of treatment- now on observation  Also has known mesenteric mass  Follow up outpatient with heme-onc      Other ascites  Assessment & Plan  Secondary to CHF versus underlying malignancy or secondary to liver dysfunction  Asked critical care for paracentesis  Crit  Care recommends IR perform paracentesis and plan for that to be done  cont Bumex increase to 2 mg oral twice daily  Add Aldactone 25 mg daily and try to gradually titrate up  No known history of liver cirrhosis or hepatitis or alcohol abuse  IR paracentesis was to be done on 12/30, patient was sent back to him room due to INR of 3 74  Patient to receive paracentesis Monday  Will hold coumadin until then  Acute on chronic diastolic CHF (congestive heart failure) (HCC)  Assessment & Plan  Wt Readings from Last 3 Encounters:   12/30/22 84 kg (185 lb 3 2 oz)       Patient takes Bumex 2 mg daily on even days and 2 mg twice daily on days  Presents with lower extremity edema and ascites  Also has history of underlying malignancy with mesenteric mass which may also be contributing to ascites  Placed on bumex 2 mg  twice daily and monitor electrolytes closely  Also asked critical care for paracentesis however they declined due to underlying mesenteric mass and will consult IR for paracentesis  Hold Coumadin for now INR 4   2D echo this admission which shows normal systolic function  Continue to hold coumadin outpatient until IR procedure done on Monday and restart after         Umbilical hernia with obstruction  Assessment & Plan  umbilical hernia without obstruction or gangrene present admission which was manually reduced by general surgery however will need outpatient follow-up      Medical Problems     Resolved Problems  Date Reviewed: 12/30/2022   None       Discharging Physician / Practitioner: Lisbeth Wheeler PA-C  PCP: Laurence Novak MD  Admission Date:   Admission Orders (From admission, onward)     Ordered        12/27/22 1716  [facility]  Once                      Discharge Date: 12/30/22    Consultations During Hospital Stay:  · General Surgery, IR    Procedures Performed:   · CT abdomen pelvis  · XR abdomen  · Echo    Significant Findings / Test Results:   · CT abdomen pelvis: Small bowel obstruction with transition point located within an umbilical hernia containing a short segment of mid small bowel  Reidentified known approximate 5 cm left mesenteric mass  Mesenteric vasculature distal to this mass is engorged as also described on outside reports  Nodular hepatic contours may indicate cirrhosis/hepatocellular disease  Large quantity of abdominal ascites  Moderate right and small left pleural effusions  · XR abdomen: No bowel obstruction  Contrast material has passed into the large bowel  Several minimally dilated small bowel loops are seen in the mid abdomen  · Echo: Left Ventricle: Left ventricular cavity size is normal  Wall thickness is mildly increased  Systolic function is normal  Wall motion is normal  Diastolic function is normal  Mitral Valve: There is mild regurgitation  Tricuspid Valve: There is mild regurgitation  Pulmonic Valve: There is mild regurgitation  Aorta: The aortic root is mildly dilated  · WBC: 2 88  · Cr: 1 39  · K: 2 9  · INR: 4 25  · Ma 5    Incidental Findings:   · See above  Test Results Pending at Discharge (will require follow up): · None     Outpatient Tests Requested:  · IR paracentesis scheduled for   · Follow up with Dr Ascencio Sessions regarding your INR and monitor outpatient  Follow up within 1 week  · Follow up with general surgery regarding hernia  · Follow up with oncology as scheduled  · Repeat labs in 1 week  Complications:  None    Reason for Admission: small bowel obstruction    Hospital Course: Deirdre Rizvi is a 71 y o  male patient with PMH of chronic diastolic CHF and lung cancer who originally presented to the hospital on 2022 due to abdominal pain and felt his hernia popping out  Workup in ED was significant for findings above   General surgery was consulted and were able to reduce patient's bowel at bedside and recommended observing patient after reduction  Also advised for small bowel follow through series  Patient's plavix and coumadin were held and made NPO  Patient's diet was increased and tolerated well  An abdominal binder was placed  Patient was also with ascites and IR was consulted  Patient was planned to have a paracentesis done on 12/30  Due to patient's elevated INR, patient did not receive paracentesis as scheduled  Patient was discharged home with instructions to report for the IR procedure on Monday  Instructed to hold his Coumadin and his plavix before the procedure and may restart after  Recommended follow up with Dr Carmen Rodríguez for monitoring of his INR and adjusting Coumadin dosage within the week  Repeat labs were ordered and advised to be complete in 1 week  Follow up with oncology as scheduled  Follow up with general surgery for hernia  Please see above list of diagnoses and related plan for additional information  Condition at Discharge: stable    Discharge Day Visit / Exam:   Subjective:  Mr James Osullivan is doing well today  He is frustrated that the paracentesis did not get done today  He denies nausea, vomiting, abdominal pain, diarrhea, constipation, chest pain, SOB, fever, chills  He has been passing flatus and having bowel movements  Feels the abdominal binder is working well for him  He has been tolerating his diet without any difficulties  Patient states that he usually gets his INR checked every 2 weeks with Dr Carmen Rodríguez and his coumadin dosage has usually stayed within a normal range for him  Vitals: Blood Pressure: 139/76 (12/30/22 0739)  Pulse: 61 (12/30/22 0739)  Temperature: (!) 97 3 °F (36 3 °C) (12/30/22 0739)  Temp Source: Temporal (12/28/22 1500)  Respirations: 19 (12/30/22 0739)  Height: 5' 7" (170 2 cm) (12/28/22 0746)  Weight - Scale: 84 kg (185 lb 3 2 oz) (12/30/22 0600)  SpO2: 97 % (12/30/22 0739)  Exam:   Physical Exam  Vitals reviewed     Constitutional:       General: He is not in acute distress  Appearance: Normal appearance  He is obese  He is not ill-appearing or toxic-appearing  HENT:      Head: Normocephalic and atraumatic  Nose: Nose normal       Mouth/Throat:      Mouth: Mucous membranes are moist    Eyes:      Pupils: Pupils are equal, round, and reactive to light  Cardiovascular:      Rate and Rhythm: Normal rate and regular rhythm  Heart sounds: Normal heart sounds  Pulmonary:      Effort: Pulmonary effort is normal  No respiratory distress  Breath sounds: Normal breath sounds  No wheezing  Abdominal:      General: Bowel sounds are normal  There is distension  Palpations: Abdomen is soft  Comments: Abdominal binder present   Musculoskeletal:         General: Normal range of motion  Cervical back: Normal range of motion  Right lower leg: No edema  Left lower leg: No edema  Skin:     General: Skin is warm and dry  Neurological:      General: No focal deficit present  Mental Status: He is alert and oriented to person, place, and time  Psychiatric:         Mood and Affect: Mood normal          Behavior: Behavior normal           Discussion with Family: He was calling his wife        Discharge instructions/Information to patient and family:   See after visit summary for information provided to patient and family  Provisions for Follow-Up Care:  See after visit summary for information related to follow-up care and any pertinent home health orders  Disposition:   Home    Planned Readmission: No     Discharge Statement:  I spent 60 minutes discharging the patient  This time was spent on the day of discharge  I had direct contact with the patient on the day of discharge  Greater than 50% of the total time was spent examining patient, answering all patient questions, arranging and discussing plan of care with patient as well as directly providing post-discharge instructions    Additional time then spent on discharge activities  Discharge Medications:  See after visit summary for reconciled discharge medications provided to patient and/or family        **Please Note: This note may have been constructed using a voice recognition system**

## 2022-12-30 NOTE — QUICK NOTE
Per nursing documentation and Epic review, on 12/23/22 and 12/27/22 the patient underwent  COMPREHENSIVE RESPIRATORY PATHOGEN PROFILE, PCR - INCLUDES COVID-19 AND INFLUENZA (Order #[id number]) on 12/23/22    These studies detected Influenza A-H3, PCR      Knute Patience

## 2022-12-30 NOTE — PLAN OF CARE
Problem: MOBILITY - ADULT  Goal: Maintain or return to baseline ADL function  Description: INTERVENTIONS:  -  Assess patient's ability to carry out ADLs; assess patient's baseline for ADL function and identify physical deficits which impact ability to perform ADLs (bathing, care of mouth/teeth, toileting, grooming, dressing, etc )  - Assess/evaluate cause of self-care deficits   - Assess range of motion  - Assess patient's mobility; develop plan if impaired  - Assess patient's need for assistive devices and provide as appropriate  - Encourage maximum independence but intervene and supervise when necessary  - Involve family in performance of ADLs  - Assess for home care needs following discharge   - Consider OT consult to assist with ADL evaluation and planning for discharge  - Provide patient education as appropriate  Outcome: Progressing  Goal: Maintains/Returns to pre admission functional level  Description: INTERVENTIONS:  - Perform BMAT or MOVE assessment daily    - Set and communicate daily mobility goal to care team and patient/family/caregiver  - Collaborate with rehabilitation services on mobility goals if consulted  - Perform Range of Motion    times a day  - Reposition patient every hours    - Dangle patient    times a day  - Stand patient    times a day  - Ambulate patient    times a day  - Out of bed to chair    times a day   - Out of bed for meals    times a day  - Out of bed for toileting  - Record patient progress and toleration of activity level   Outcome: Progressing     Problem: RESPIRATORY - ADULT  Goal: Achieves optimal ventilation and oxygenation  Description: INTERVENTIONS:  - Assess for changes in respiratory status GAMA, SOB confusion  - Assess for changes in mentation and behavior  - Position to facilitate oxygenation and minimize respiratory effort  - Oxygen administered by appropriate delivery if ordered  - Initiate smoking cessation education as indicated  - Encourage broncho-pulmonary hygiene including cough, deep breathe, Incentive Spirometry  - Assess the need for suctioning and aspirate as needed  - Assess and instruct to report SOB or any respiratory difficulty  - Respiratory Therapy support as indicated  Outcome: Progressing     Problem: Potential for Falls  Goal: Patient will remain free of falls  Description: INTERVENTIONS:  - Educate patient/family on patient safety including physical limitations  - Instruct patient to call for assistance with activity   - Consult OT/PT to assist with strengthening/mobility   - Keep Call bell within reach  - Keep bed low and locked with side rails adjusted as appropriate  - Keep care items and personal belongings within reach  - Initiate and maintain comfort rounds  - Make Fall Risk Sign visible to staff  - Offer Toileting every    Hours, in advance of need  - Initiate/Maintain   alarm  - Obtain necessary fall risk management equipment:     - Apply yellow socks and bracelet for high fall risk patients  - Consider moving patient to room near nurses station  Outcome: Progressing

## 2022-12-30 NOTE — ASSESSMENT & PLAN NOTE
Wt Readings from Last 3 Encounters:   12/30/22 84 kg (185 lb 3 2 oz)       Patient takes Bumex 2 mg daily on even days and 2 mg twice daily on days  Presents with lower extremity edema and ascites  Also has history of underlying malignancy with mesenteric mass which may also be contributing to ascites  Placed on bumex 2 mg  twice daily and monitor electrolytes closely  Also asked critical care for paracentesis however they declined due to underlying mesenteric mass and will consult IR for paracentesis  Hold Coumadin for now INR 4   2D echo this admission which shows normal systolic function  Continue to hold coumadin outpatient until IR procedure done on Monday and restart after

## 2022-12-30 NOTE — CASE MANAGEMENT
Case Management Progress Note    Patient name Minal Howard  Location [address] [id number] MRN [id number]  : 1950 Date 2022       LOS (days): 3  Geometric Mean LOS (GMLOS) (days): 4 30  Days to GMLOS:1 5        OBJECTIVE:        Current admission status: Inpatient  Preferred Pharmacy:    [address]  Phone: [phone] Fax: [phone]    Primary Care Provider: Puneet Alston MD    Primary Insurance: MEDICARE  Secondary Insurance: [facility] [employer] PROGRAM    PROGRESS NOTE:    Patient was scheduled for IR Paracethesis   was scheduled for 0800 at [address] [place] [address]  Transport arrived at [address] [place] [address]  Procedure cancelled  Provider aware of same    Patient being discharged today and will get Paracenthesis / labs as out patient  Follow up with physicians     CM spoke with patient wife and she declined home health services      Spouse to transport home

## 2023-02-13 ENCOUNTER — APPOINTMENT (OUTPATIENT)
Dept: LAB | Facility: CLINIC | Age: 73
End: 2023-02-13

## 2023-02-13 DIAGNOSIS — R18.8 OTHER ASCITES: ICD-10-CM

## 2023-02-13 DIAGNOSIS — Z79.899 ENCOUNTER FOR LONG-TERM (CURRENT) USE OF OTHER MEDICATIONS: ICD-10-CM

## 2023-02-13 DIAGNOSIS — E87.6 HYPOKALEMIA: ICD-10-CM

## 2023-02-13 DIAGNOSIS — I50.43 ACUTE ON CHRONIC COMBINED SYSTOLIC AND DIASTOLIC HEART FAILURE (HCC): ICD-10-CM

## 2023-02-13 LAB
ALBUMIN SERPL BCP-MCNC: 1.6 G/DL (ref 3.5–5)
ALP SERPL-CCNC: 96 U/L (ref 46–116)
ALT SERPL W P-5'-P-CCNC: 46 U/L (ref 12–78)
ANION GAP SERPL CALCULATED.3IONS-SCNC: 5 MMOL/L (ref 4–13)
AST SERPL W P-5'-P-CCNC: 58 U/L (ref 5–45)
BASOPHILS # BLD AUTO: 0.06 THOUSANDS/ÂΜL (ref 0–0.1)
BASOPHILS NFR BLD AUTO: 1 % (ref 0–1)
BILIRUB SERPL-MCNC: 0.33 MG/DL (ref 0.2–1)
BUN SERPL-MCNC: 23 MG/DL (ref 5–25)
CALCIUM ALBUM COR SERPL-MCNC: 10.5 MG/DL (ref 8.3–10.1)
CALCIUM SERPL-MCNC: 8.6 MG/DL (ref 8.3–10.1)
CHLORIDE SERPL-SCNC: 109 MMOL/L (ref 96–108)
CO2 SERPL-SCNC: 28 MMOL/L (ref 21–32)
CREAT SERPL-MCNC: 1.19 MG/DL (ref 0.6–1.3)
EOSINOPHIL # BLD AUTO: 0.08 THOUSAND/ÂΜL (ref 0–0.61)
EOSINOPHIL NFR BLD AUTO: 2 % (ref 0–6)
ERYTHROCYTE [DISTWIDTH] IN BLOOD BY AUTOMATED COUNT: 15.2 % (ref 11.6–15.1)
GFR SERPL CREATININE-BSD FRML MDRD: 61 ML/MIN/1.73SQ M
GLUCOSE SERPL-MCNC: 130 MG/DL (ref 65–140)
HCT VFR BLD AUTO: 37.5 % (ref 36.5–49.3)
HGB BLD-MCNC: 12.2 G/DL (ref 12–17)
IMM GRANULOCYTES # BLD AUTO: 0.02 THOUSAND/UL (ref 0–0.2)
IMM GRANULOCYTES NFR BLD AUTO: 0 % (ref 0–2)
INR PPP: 2.11 (ref 0.84–1.19)
LYMPHOCYTES # BLD AUTO: 0.8 THOUSANDS/ÂΜL (ref 0.6–4.47)
LYMPHOCYTES NFR BLD AUTO: 16 % (ref 14–44)
MCH RBC QN AUTO: 30.6 PG (ref 26.8–34.3)
MCHC RBC AUTO-ENTMCNC: 32.5 G/DL (ref 31.4–37.4)
MCV RBC AUTO: 94 FL (ref 82–98)
MONOCYTES # BLD AUTO: 0.55 THOUSAND/ÂΜL (ref 0.17–1.22)
MONOCYTES NFR BLD AUTO: 11 % (ref 4–12)
NEUTROPHILS # BLD AUTO: 3.53 THOUSANDS/ÂΜL (ref 1.85–7.62)
NEUTS SEG NFR BLD AUTO: 70 % (ref 43–75)
NRBC BLD AUTO-RTO: 0 /100 WBCS
PLATELET # BLD AUTO: 254 THOUSANDS/UL (ref 149–390)
PMV BLD AUTO: 10.9 FL (ref 8.9–12.7)
POTASSIUM SERPL-SCNC: 4.7 MMOL/L (ref 3.5–5.3)
PROT SERPL-MCNC: 4.7 G/DL (ref 6.4–8.4)
PROTHROMBIN TIME: 23.9 SECONDS (ref 11.6–14.5)
RBC # BLD AUTO: 3.99 MILLION/UL (ref 3.88–5.62)
SODIUM SERPL-SCNC: 142 MMOL/L (ref 135–147)
WBC # BLD AUTO: 5.04 THOUSAND/UL (ref 4.31–10.16)

## 2023-02-14 LAB
NT-PROBNP SERPL-MCNC: 1536 PG/ML
PROTHROMBIN TIME: 23.9 SECONDS (ref 11.6–14.5)
PT 1H NP PPP: 15.6 SEC (ref 12–14.3)
PT IMM NP PPP: 14.3 SECONDS (ref 12–14.3)

## 2023-02-28 PROBLEM — J10.1 INFLUENZA A: Status: RESOLVED | Noted: 2022-12-30 | Resolved: 2023-02-28

## 2023-03-08 ENCOUNTER — APPOINTMENT (OUTPATIENT)
Dept: LAB | Facility: CLINIC | Age: 73
End: 2023-03-08

## 2023-03-08 DIAGNOSIS — I50.9 CONGESTIVE HEART FAILURE, UNSPECIFIED HF CHRONICITY, UNSPECIFIED HEART FAILURE TYPE (HCC): Primary | ICD-10-CM

## 2023-03-08 DIAGNOSIS — R18.8 OTHER ASCITES: ICD-10-CM

## 2023-03-08 DIAGNOSIS — Z79.01 LONG TERM (CURRENT) USE OF ANTICOAGULANTS: ICD-10-CM

## 2023-03-08 LAB
ANION GAP SERPL CALCULATED.3IONS-SCNC: 2 MMOL/L (ref 4–13)
BASOPHILS # BLD AUTO: 0.06 THOUSANDS/ÂΜL (ref 0–0.1)
BASOPHILS NFR BLD AUTO: 1 % (ref 0–1)
BUN SERPL-MCNC: 30 MG/DL (ref 5–25)
CALCIUM SERPL-MCNC: 8.6 MG/DL (ref 8.3–10.1)
CHLORIDE SERPL-SCNC: 112 MMOL/L (ref 96–108)
CO2 SERPL-SCNC: 29 MMOL/L (ref 21–32)
CREAT SERPL-MCNC: 1.25 MG/DL (ref 0.6–1.3)
EOSINOPHIL # BLD AUTO: 0.13 THOUSAND/ÂΜL (ref 0–0.61)
EOSINOPHIL NFR BLD AUTO: 3 % (ref 0–6)
ERYTHROCYTE [DISTWIDTH] IN BLOOD BY AUTOMATED COUNT: 15.7 % (ref 11.6–15.1)
GFR SERPL CREATININE-BSD FRML MDRD: 57 ML/MIN/1.73SQ M
GLUCOSE SERPL-MCNC: 114 MG/DL (ref 65–140)
HCT VFR BLD AUTO: 37.3 % (ref 36.5–49.3)
HGB BLD-MCNC: 12 G/DL (ref 12–17)
IMM GRANULOCYTES # BLD AUTO: 0.02 THOUSAND/UL (ref 0–0.2)
IMM GRANULOCYTES NFR BLD AUTO: 0 % (ref 0–2)
INR PPP: 3.44 (ref 0.84–1.19)
LYMPHOCYTES # BLD AUTO: 0.85 THOUSANDS/ÂΜL (ref 0.6–4.47)
LYMPHOCYTES NFR BLD AUTO: 16 % (ref 14–44)
MCH RBC QN AUTO: 30.4 PG (ref 26.8–34.3)
MCHC RBC AUTO-ENTMCNC: 32.2 G/DL (ref 31.4–37.4)
MCV RBC AUTO: 94 FL (ref 82–98)
MONOCYTES # BLD AUTO: 0.46 THOUSAND/ÂΜL (ref 0.17–1.22)
MONOCYTES NFR BLD AUTO: 9 % (ref 4–12)
NEUTROPHILS # BLD AUTO: 3.71 THOUSANDS/ÂΜL (ref 1.85–7.62)
NEUTS SEG NFR BLD AUTO: 71 % (ref 43–75)
NRBC BLD AUTO-RTO: 0 /100 WBCS
NT-PROBNP SERPL-MCNC: 1854 PG/ML
PLATELET # BLD AUTO: 256 THOUSANDS/UL (ref 149–390)
PMV BLD AUTO: 10.8 FL (ref 8.9–12.7)
POTASSIUM SERPL-SCNC: 4.1 MMOL/L (ref 3.5–5.3)
PROTHROMBIN TIME: 35 SECONDS (ref 11.6–14.5)
RBC # BLD AUTO: 3.95 MILLION/UL (ref 3.88–5.62)
SODIUM SERPL-SCNC: 143 MMOL/L (ref 135–147)
WBC # BLD AUTO: 5.23 THOUSAND/UL (ref 4.31–10.16)

## 2023-03-09 LAB — HBV CORE AB SER QL: NORMAL

## 2023-03-10 LAB
HCV AB S/CO SERPL IA: NON REACTIVE
SL AMB INTERPRETATION: NORMAL

## 2023-05-08 ENCOUNTER — APPOINTMENT (OUTPATIENT)
Dept: LAB | Facility: CLINIC | Age: 73
End: 2023-05-08

## 2023-06-05 ENCOUNTER — APPOINTMENT (OUTPATIENT)
Dept: LAB | Facility: CLINIC | Age: 73
End: 2023-06-05
Payer: MEDICARE

## 2023-06-05 DIAGNOSIS — C34.90 MALIGNANT NEOPLASM OF LUNG, UNSPECIFIED LATERALITY, UNSPECIFIED PART OF LUNG (HCC): ICD-10-CM

## 2023-06-05 DIAGNOSIS — R94.5 NONSPECIFIC ABNORMAL RESULTS OF LIVER FUNCTION STUDY: ICD-10-CM

## 2023-06-05 DIAGNOSIS — E87.6 HYPOKALEMIA: ICD-10-CM

## 2023-06-05 DIAGNOSIS — I50.43 ACUTE ON CHRONIC COMBINED SYSTOLIC AND DIASTOLIC HEART FAILURE (HCC): ICD-10-CM

## 2023-06-05 LAB
ALBUMIN SERPL BCP-MCNC: 1.9 G/DL (ref 3.5–5)
ALP SERPL-CCNC: 89 U/L (ref 46–116)
ALT SERPL W P-5'-P-CCNC: 29 U/L (ref 12–78)
ANION GAP SERPL CALCULATED.3IONS-SCNC: 0 MMOL/L (ref 4–13)
AST SERPL W P-5'-P-CCNC: 30 U/L (ref 5–45)
BASOPHILS # BLD AUTO: 0.06 THOUSANDS/ÂΜL (ref 0–0.1)
BASOPHILS NFR BLD AUTO: 1 % (ref 0–1)
BILIRUB DIRECT SERPL-MCNC: 0.2 MG/DL (ref 0–0.2)
BILIRUB SERPL-MCNC: 0.34 MG/DL (ref 0.2–1)
BUN SERPL-MCNC: 28 MG/DL (ref 5–25)
CALCIUM SERPL-MCNC: 8.6 MG/DL (ref 8.3–10.1)
CHLORIDE SERPL-SCNC: 116 MMOL/L (ref 96–108)
CO2 SERPL-SCNC: 28 MMOL/L (ref 21–32)
CREAT SERPL-MCNC: 1.38 MG/DL (ref 0.6–1.3)
EOSINOPHIL # BLD AUTO: 0.13 THOUSAND/ÂΜL (ref 0–0.61)
EOSINOPHIL NFR BLD AUTO: 3 % (ref 0–6)
ERYTHROCYTE [DISTWIDTH] IN BLOOD BY AUTOMATED COUNT: 15.6 % (ref 11.6–15.1)
GFR SERPL CREATININE-BSD FRML MDRD: 51 ML/MIN/1.73SQ M
GLUCOSE SERPL-MCNC: 113 MG/DL (ref 65–140)
HCT VFR BLD AUTO: 37.5 % (ref 36.5–49.3)
HGB BLD-MCNC: 12.5 G/DL (ref 12–17)
IMM GRANULOCYTES # BLD AUTO: 0.01 THOUSAND/UL (ref 0–0.2)
IMM GRANULOCYTES NFR BLD AUTO: 0 % (ref 0–2)
LYMPHOCYTES # BLD AUTO: 0.8 THOUSANDS/ÂΜL (ref 0.6–4.47)
LYMPHOCYTES NFR BLD AUTO: 18 % (ref 14–44)
MCH RBC QN AUTO: 31.5 PG (ref 26.8–34.3)
MCHC RBC AUTO-ENTMCNC: 33.3 G/DL (ref 31.4–37.4)
MCV RBC AUTO: 95 FL (ref 82–98)
MONOCYTES # BLD AUTO: 0.33 THOUSAND/ÂΜL (ref 0.17–1.22)
MONOCYTES NFR BLD AUTO: 8 % (ref 4–12)
NEUTROPHILS # BLD AUTO: 3.09 THOUSANDS/ÂΜL (ref 1.85–7.62)
NEUTS SEG NFR BLD AUTO: 70 % (ref 43–75)
NRBC BLD AUTO-RTO: 0 /100 WBCS
PLATELET # BLD AUTO: 240 THOUSANDS/UL (ref 149–390)
PMV BLD AUTO: 11.1 FL (ref 8.9–12.7)
POTASSIUM SERPL-SCNC: 4.1 MMOL/L (ref 3.5–5.3)
PROT SERPL-MCNC: 5.1 G/DL (ref 6.4–8.4)
RBC # BLD AUTO: 3.97 MILLION/UL (ref 3.88–5.62)
SODIUM SERPL-SCNC: 144 MMOL/L (ref 135–147)
WBC # BLD AUTO: 4.42 THOUSAND/UL (ref 4.31–10.16)

## 2023-06-05 PROCEDURE — 85025 COMPLETE CBC W/AUTO DIFF WBC: CPT

## 2023-06-05 PROCEDURE — 80076 HEPATIC FUNCTION PANEL: CPT

## 2023-06-05 PROCEDURE — 80048 BASIC METABOLIC PNL TOTAL CA: CPT

## 2023-06-30 ENCOUNTER — APPOINTMENT (OUTPATIENT)
Dept: LAB | Facility: CLINIC | Age: 73
End: 2023-06-30
Payer: MEDICARE

## 2023-07-31 ENCOUNTER — OFFICE VISIT (OUTPATIENT)
Dept: URGENT CARE | Facility: CLINIC | Age: 73
End: 2023-07-31
Payer: MEDICARE

## 2023-07-31 ENCOUNTER — EVALUATION (OUTPATIENT)
Dept: PHYSICAL THERAPY | Facility: CLINIC | Age: 73
End: 2023-07-31
Payer: MEDICARE

## 2023-07-31 VITALS
OXYGEN SATURATION: 97 % | DIASTOLIC BLOOD PRESSURE: 60 MMHG | HEIGHT: 67 IN | HEART RATE: 47 BPM | RESPIRATION RATE: 16 BRPM | TEMPERATURE: 96.7 F | WEIGHT: 159 LBS | BODY MASS INDEX: 24.96 KG/M2 | SYSTOLIC BLOOD PRESSURE: 114 MMHG

## 2023-07-31 DIAGNOSIS — B02.9 HERPES ZOSTER WITHOUT COMPLICATION: Primary | ICD-10-CM

## 2023-07-31 DIAGNOSIS — S82.142D CLOSED FRACTURE OF LEFT TIBIAL PLATEAU WITH ROUTINE HEALING, SUBSEQUENT ENCOUNTER: Primary | ICD-10-CM

## 2023-07-31 PROCEDURE — 97162 PT EVAL MOD COMPLEX 30 MIN: CPT | Performed by: PHYSICAL THERAPIST

## 2023-07-31 PROCEDURE — G0463 HOSPITAL OUTPT CLINIC VISIT: HCPCS

## 2023-07-31 PROCEDURE — 99203 OFFICE O/P NEW LOW 30 MIN: CPT

## 2023-07-31 PROCEDURE — 97110 THERAPEUTIC EXERCISES: CPT | Performed by: PHYSICAL THERAPIST

## 2023-07-31 RX ORDER — ACETAMINOPHEN 500 MG
1000 TABLET ORAL EVERY 6 HOURS PRN
COMMUNITY

## 2023-07-31 RX ORDER — WARFARIN SODIUM 2.5 MG/1
2.5 TABLET ORAL
COMMUNITY

## 2023-07-31 RX ORDER — GABAPENTIN 100 MG/1
100 CAPSULE ORAL 3 TIMES DAILY
COMMUNITY

## 2023-07-31 RX ORDER — CLOPIDOGREL BISULFATE 75 MG/1
75 TABLET ORAL DAILY
COMMUNITY

## 2023-07-31 RX ORDER — VALACYCLOVIR HYDROCHLORIDE 1 G/1
1000 TABLET, FILM COATED ORAL 3 TIMES DAILY
Qty: 21 TABLET | Refills: 0 | Status: SHIPPED | OUTPATIENT
Start: 2023-07-31 | End: 2023-08-07

## 2023-07-31 NOTE — PROGRESS NOTES
PT Evaluation     Today's date: 2023  Patient name: Vladimir Yang  : 1950  MRN: [id number]  Referring provider: Lynette Ortez PA-C  Dx:   Encounter Diagnosis     ICD-10-CM    1. Closed fracture of left tibial plateau with routine healing, subsequent encounter  S82.142D                      Assessment  Assessment details: Vladimir Yang is a pleasant 71 y.o. male presenting to PT with cc of L knee stiffness and pain 2* closed proximal tibial plateau fx sustained . Pt. States he was finishing up appointment for cancer scan and slipped leaving building - he went to ER and received locked trom brace and crutches. Upon examination, patient was found to have objective deficits as listed below and is displaying ss consistent with knee hypomobility, quad atrophy, and joint effusion. Pt. Is experiencing subsequent functional deficits including inability to bare weight in LLE, required A for ADLs, and inability to navigate stairs. Pt. Was educated on role of PT to address issues and given initial HEP. Pt. Would benefit from skilled physical therapy to promote improved function and maximize activity tolerance. Pt. Referred to [facility] for eval of L trunk rash consistent with shingles    Symptom irritability: high  Goals  ST weeks  -pt. Will demonstrate L knee prom flexion to 110*  -Pt. Will demonstrate L knee VMO contraction wnl and demonstrate SLR s lag    LT weeks  -Pt. Will demonstate gait pattern wnl  -Pt.  Will demonstate L knee strength grossly 4+/5    Plan  Patient would benefit from: skilled physical therapy  Planned modality interventions: cryotherapy, high voltage pulsed current: pain management, high voltage pulsed current: spasm management and unattended electrical stimulation  Planned therapy interventions: abdominal trunk stabilization, ADL training, balance, balance/weight bearing training, body mechanics training, flexibility, functional ROM exercises, gait training, graded exercise, graded motor, home exercise program, therapeutic training, therapeutic exercise, therapeutic activities, stretching, strengthening, patient education, postural training, neuromuscular re-education, motor coordination training, Storey taping, manual therapy, kinesiology taping, joint mobilization and ADL retraining  Frequency: 2x week  Duration in weeks: 6  Plan of Care beginning date: 2023  Plan of Care expiration date: 2023  Treatment plan discussed with: patient and family        Subjective Evaluation    History of Present Illness  Mechanism of injury: Elizabeth Pa presents to PT with cc of L knee pain and stiffness 2* proximal tib plateau fracture sustained . Pt. Slipped and fell when leaving appointment and went immediately to er. He was dx with fracture and given trom brace as well as crutches. Pt. F/u with ortho on  and has since been referred to PT. Pt. Is to remain NWB and progress ROM per PT.   Pt. Notes pain is improved in knee at rest, but is increased with any motion as well as walking. He is compliant with nwb. He feels crutches are going very slowly but he is able to use them . He has rash on L trunk that he is concerned about.    Prior to injury patient was very active and walked 2 miles a day    Patient Goals  Patient goals for therapy: decreased pain, improved balance, increased motion, return to sport/leisure activities, independence with ADLs/IADLs, increased strength and decreased edema    Pain  Current pain ratin  At best pain ratin  At worst pain rating: 10  Location: l knee  Quality: tight and pressure  Relieving factors: ice and medications    Social Support  Steps to enter house: no  Stairs in house: no   Lives with: spouse    Employment status: not working    Diagnostic Tests  X-ray: abnormal  Treatments  Previous treatment: immobilization and medication  Current treatment: immobilization and medication        Objective     Observations   Left Knee Positive for edema. Additional Observation Details  hemosiderin staining BLE    Noted L quad and calf atrophy    Noted effusion inferior l knee    Palpation     Additional Palpation Details  ttp along l medial joint line    Tenderness   Left Knee   Tenderness in the LCL (distal), LCL (proximal), MCL (distal), MCL (proximal), medial joint line, medial patella and patellar tendon.      Active Range of Motion   Left Knee   Flexion: 35 degrees   Extension: 3 degrees   Extensor lag: 3 degrees     Right Knee   Normal active range of motion    Passive Range of Motion   Left Knee   Flexion: 40 degrees     Swelling     Left Knee Girth Measurement (cm)   Joint line: 43 cm    Right Knee Girth Measurement (cm)   Joint line: 40 cm      Flowsheet Rows    Flowsheet Row Most Recent Value   PT/OT G-Codes    Current Score 4   Projected Score 55   FOTO information reviewed Yes             Precautions: CHF, NWB LLE      Daily Treatment Diary:      Initial Evaluation Date: 07/31/23  Compliance 7/31                     Visit Number 1                    Re-Eval  IE                    Foto Captured Y                           7/31                     Manual                      Retrograde massage -                     prom -                     Patellar mobz -                     Ther-Ex                      Bike -                     Quad sets 10x10"                     APs c bolster 20x                     Bolster stretch 3'                     Knee flexion stretch in supine 20x10"                     Heel slides  20x arom                     Heel slides c strap 20x                      Hamstring stretch -                     Prone knee bends - -                                         Neuro Re-Ed                                                                                                Ther-Act                                                               Modalities                      CP -

## 2023-07-31 NOTE — PROGRESS NOTES
[facility]        NAME: Tashia Donald is a 71 y.o. male  : 1950    MRN: [id number]  DATE: 2023  TIME: 3:28 PM    Assessment and Plan   Herpes zoster without complication [M55.9]  1. Herpes zoster without complication  valACYclovir (VALTREX) 1,000 mg tablet        Skin findings resemble herpes zoster infection and will treat with valacyclovir. Encouraged continued supportive measures. Contagious. Consider Shingles vaccination. Follow up with PCP in 3-5 days or proceed to emergency department for worsening symptoms. Patient and wife verbalized understanding of instructions given. Patient Instructions     Patient Instructions       Contagious until lesions crusted   Avoid those immunocompromised, newborns, pregnant women, or elderly  Topical analgesics  Consider Shingles vaccination  Follow up with PCP in 3-5 days. Proceed to  ER if symptoms worsen. Shingles   AMBULATORY CARE:   Shingles  is a viral infection that causes a painful rash. Shingles is caused by the varicella-zoster virus. This is the same virus that causes chickenpox. The virus stays in your body after you have chickenpox, without causing any symptoms. Shingles occurs when the virus becomes active again. The active virus travels along a nerve to your skin and causes a rash. The rash usually lasts 2 to 3 weeks. Most people have shingles one time, but it is possible to develop it again. Common signs and symptoms:  Shingles can appear anywhere on your body, but it is most common on your torso. A line of painful blisters develops on the left or right side of your torso. The rash starts as red dots that become blisters filled with fluid. The blisters usually grow bigger, become filled with pus, and then crust over after a few days.  You may also have any of the following:  • Severe tiredness and muscle weakness    • Pain when your skin is lightly touched    • Headache    • Fever    • Eye pain when exposed to light Call your local emergency number (911 in the [address]) if:   • You have trouble moving your arms, legs, or face. • You become confused, or have trouble speaking. • You have a seizure. Seek care immediately if:   • You have weakness in an arm or leg. • You have dizziness, a severe headache, or hearing or vision loss. • You have painful, red, warm skin around the blisters, or the blisters drain pus. • Your neck is stiff or you have trouble moving it. Call your doctor if:   • A painful rash appears near your eye. • The rash spreads to more areas and your pain worsens. • You feel weak or have a headache. • You have a cough, chills, or a fever. • You have abdominal pain or nausea, or you are vomiting. • You have questions or concerns about your condition or care. Treatment:  Shingles cannot be cured. The following medicines can decrease your pain and help prevent complications:  • Antiviral medicine  fights the virus causing your shingles. Start this medicine within 3 days after you notice the first symptoms. This may help prevent nerve pain. A shingles outbreak can cause nerve pain called post-herpetic neuralgia (PHN). PHN can last a long time after you heal from shingles. • Topical anesthetics  are used to numb the skin and decrease pain. They can be a cream, gel, spray, or patch. • Anticonvulsants and antidepressants  decrease nerve pain and may help you sleep at night. • Antihistamines  may help decrease itching. • Acetaminophen  decreases pain and fever. It is available without a doctor's order. Ask how much to take and how often to take it. Follow directions. Read the labels of all other medicines you are using to see if they also contain acetaminophen, or ask your doctor or pharmacist. Acetaminophen can cause liver damage if not taken correctly. • NSAIDs , such as ibuprofen, help decrease swelling, pain, and fever.  This medicine is available with or without a doctor's order. NSAIDs can cause stomach bleeding or kidney problems in certain people. If you take blood thinner medicine, always ask your healthcare provider if NSAIDs are safe for you. Always read the medicine label and follow directions. • A steroid and numbing medicine injection  may decrease severe pain that does not get better with other medicines. Self-care:   • Apply a cool, wet compress  or take a cool bath. This may help decrease itching and pain. • Keep your rash clean and dry. Cover your rash with a bandage. Do not use bandages with adhesive. Clothes may irritate your skin. Prevent the spread of the shingles virus:  The virus can be passed to a person who has never had chickenpox. This usually happens if the other person comes in contact with your open sores. This person may get chickenpox, but not shingles. You are contagious until your blisters scab over. Stay away from people who have not had chickenpox or the chickenpox vaccine. Avoid pregnant women, newborns, and people with weak immune systems. They have a higher risk of infection. • Wash your hands often. Wash your hands several times each day. Wash after you use the bathroom, change a child's diaper, and before you prepare or eat food. Use soap and water every time. Rub your soapy hands together, lacing your fingers. Wash the front and back of your hands, and in between your fingers. Use the fingers of one hand to scrub under the fingernails of the other hand. Wash for at least 20 seconds. Rinse with warm, running water for several seconds. Then dry your hands with a clean towel or paper towel. Use hand  that contains alcohol if soap and water are not available. Do not touch your eyes, nose, or mouth without washing your hands first.         • Cover a sneeze or cough. Use a tissue that covers your mouth and nose. Throw the tissue away in a trash can right away. Use the bend of your arm if a tissue is not available.  Wash your hands well with soap and water or use a hand . Prevent shingles or another shingles outbreak:   • A vaccine may be given to help prevent shingles. You can get the vaccine even if you already had shingles. The vaccine comes in 2 forms. A 2-dose vaccine is usually given to adults 48 years or older. A 1-dose vaccine may be given to adults 61 years or older. • The vaccine can help prevent a future outbreak. If you do get shingles again, the vaccine can keep it from becoming severe. Ask your healthcare provider about other vaccines you may need. Follow up with your doctor as directed:  Write down your questions so you remember to ask them during your visits. For more information:   • Centers for Disease Control and Prevention  [address]  [place]  Phone: 3- 323 - 7663846  Phone: 4- 103 - 7076478  Web Address: [employer]    © Copyright [employer] 2022 Information is for End User's use only and may not be sold, redistributed or otherwise used for commercial purposes. The above information is an  only. It is not intended as medical advice for individual conditions or treatments. Talk to your doctor, nurse or pharmacist before following any medical regimen to see if it is safe and effective for you. Chief Complaint     Chief Complaint   Patient presents with   • Rash     C/o rash over left upper back and side beneath axilla. Painful at times. History of Present Illness       67-year-old male with a past medical history significant for lung cancer in remission and CHF presents from physical therapy with complaints of rash to left side of upper back and torso x3 days. Patient reports prior to rash starting he experienced pain. He reports some itching and burning. Denies any known allergic contacts or exposures. No fever, chills, or flu-like symptoms.   He received the first shingles vaccination but did not receive the second dose in the series. He reports having chickenpox as a child. Review of Systems   Review of Systems   Constitutional: Negative for chills and fever. HENT: Negative for congestion, ear discharge, ear pain, rhinorrhea, sore throat, trouble swallowing and voice change. Eyes: Negative for discharge. Respiratory: Negative for cough and shortness of breath. Cardiovascular: Negative for chest pain. Gastrointestinal: Negative for abdominal pain, diarrhea, nausea and vomiting. Musculoskeletal: Negative for myalgias. Skin: Positive for rash.          Current Medications       Current Outpatient Medications:   •  acetaminophen (TYLENOL) 500 mg tablet, Take 1,000 mg by mouth every 6 (six) hours as needed for mild pain, Disp: , Rfl:   •  Amylase-Lipase-Protease (CREON 10 PO), Take 3 tablets by mouth 3 (three) times a day 3 TIMES daily with before meals (per patient 12,000 per tablet), Disp: , Rfl:   •  atorvastatin (LIPITOR) 80 mg tablet, Take 80 mg by mouth daily, Disp: , Rfl:   •  bumetanide (BUMEX) 2 mg tablet, Take 1 tablet (2 mg total) by mouth 2 (two) times a day, Disp: 60 tablet, Rfl: 0  •  cholecalciferol (VITAMIN D3) 1,000 units tablet, Take 2,000 Units by mouth daily, Disp: , Rfl:   •  clopidogrel (PLAVIX) 75 mg tablet, Take 75 mg by mouth daily, Disp: , Rfl:   •  gabapentin (NEURONTIN) 100 mg capsule, Take 100 mg by mouth 3 (three) times a day, Disp: , Rfl:   •  isosorbide mononitrate (IMDUR) 60 mg 24 hr tablet, Take 60 mg by mouth daily, Disp: , Rfl:   •  losartan (COZAAR) 100 MG tablet, Take 100 mg by mouth daily, Disp: , Rfl:   •  magnesium 30 MG tablet, Take 400 mg by mouth in the morning, Disp: , Rfl:   •  pantoprazole (PROTONIX) 40 mg tablet, Take 40 mg by mouth daily, Disp: , Rfl:   •  potassium chloride (MICRO-K) 10 MEQ CR capsule, Take 1 capsule (10 mEq total) by mouth daily (Patient taking differently: Take 8 mEq by mouth daily), Disp: 30 capsule, Rfl: 0  •  sotalol (BETAPACE) 160 MG tablet, Take 160 mg by mouth 2 (two) times a day, Disp: , Rfl:   •  valACYclovir (VALTREX) 1,000 mg tablet, Take 1 tablet (1,000 mg total) by mouth 3 (three) times a day for 7 days, Disp: 21 tablet, Rfl: 0  •  warfarin (COUMADIN) 2.5 mg tablet, Take 2.5 mg by mouth daily, Disp: , Rfl:   •  co-enzyme Q-10 30 MG capsule, Take 100 mg by mouth once a week Twice a week Monday and thursday (Patient not taking: Reported on 7/31/2023), Disp: , Rfl:   •  spironolactone (ALDACTONE) 25 mg tablet, Take 1 tablet (25 mg total) by mouth daily Do not start before December 31, 2022., Disp: 30 tablet, Rfl: 0    Current Allergies     Allergies as of 07/31/2023 - Reviewed 07/31/2023   Allergen Reaction Noted   • Acetaminophen Other (See Comments) 12/14/2022   • Emend [aprepitant] Hives 12/14/2022   • Other Other (See Comments) 02/26/2020   • Percocet [oxycodone-acetaminophen] Delirium 12/14/2022            The following portions of the patient's history were reviewed and updated as appropriate: allergies, current medications, past family history, past medical history, past social history, past surgical history and problem list.     Past Medical History:   Diagnosis Date   • CHF (congestive heart failure) ([address])    • Lung cancer ([address])     stage 4   • Tibial plateau fracture, left        Past Surgical History:   Procedure Laterality Date   • CARDIAC CATHETERIZATION     • CAROTID ENDARTERECTOMY Left    • CHOLECYSTECTOMY     • HERNIA REPAIR     • TONSILLECTOMY         Family History   Problem Relation Age of Onset   • Hypertension Father          Medications have been verified. Objective   /60   Pulse (!) 47   Temp (!) 96.7 °F (35.9 °C)   Resp 16   Ht 5' 7" (1.702 m)   Wt 72.1 kg (159 lb)   SpO2 97%   BMI 24.90 kg/m²   No LMP for male patient. Physical Exam     Physical Exam  Vitals and nursing note reviewed. Constitutional:       General: He is not in acute distress. Appearance: He is not toxic-appearing.    HENT:      Head: Normocephalic. Nose: Nose normal.      Mouth/Throat:      Mouth: Mucous membranes are moist.      Pharynx: Oropharynx is clear. Eyes:      Conjunctiva/sclera: Conjunctivae normal.   Cardiovascular:      Rate and Rhythm: Regular rhythm. Bradycardia present. Heart sounds: Normal heart sounds. Pulmonary:      Effort: Pulmonary effort is normal. No respiratory distress. Breath sounds: Normal breath sounds. No stridor. No wheezing, rhonchi or rales. Skin:     General: Skin is warm and dry. Findings: Rash present. Neurological:      Mental Status: He is alert and oriented to person, place, and time. Gait: Gait is intact.    Psychiatric:         Mood and Affect: Mood normal.         Behavior: Behavior normal.

## 2023-07-31 NOTE — PATIENT INSTRUCTIONS
Contagious until lesions crusted   Avoid those immunocompromised, newborns, pregnant women, or elderly  Topical analgesics  Consider Shingles vaccination  Follow up with PCP in 3-5 days. Proceed to  ER if symptoms worsen. Shingles   AMBULATORY CARE:   Shingles  is a viral infection that causes a painful rash. Shingles is caused by the varicella-zoster virus. This is the same virus that causes chickenpox. The virus stays in your body after you have chickenpox, without causing any symptoms. Shingles occurs when the virus becomes active again. The active virus travels along a nerve to your skin and causes a rash. The rash usually lasts 2 to 3 weeks. Most people have shingles one time, but it is possible to develop it again. Common signs and symptoms:  Shingles can appear anywhere on your body, but it is most common on your torso. A line of painful blisters develops on the left or right side of your torso. The rash starts as red dots that become blisters filled with fluid. The blisters usually grow bigger, become filled with pus, and then crust over after a few days. You may also have any of the following:  Severe tiredness and muscle weakness    Pain when your skin is lightly touched    Headache    Fever    Eye pain when exposed to light       Call your local emergency number (911 in the [address]) if:   You have trouble moving your arms, legs, or face. You become confused, or have trouble speaking. You have a seizure. Seek care immediately if:   You have weakness in an arm or leg. You have dizziness, a severe headache, or hearing or vision loss. You have painful, red, warm skin around the blisters, or the blisters drain pus. Your neck is stiff or you have trouble moving it. Call your doctor if:   A painful rash appears near your eye. The rash spreads to more areas and your pain worsens. You feel weak or have a headache. You have a cough, chills, or a fever.     You have abdominal pain or nausea, or you are vomiting. You have questions or concerns about your condition or care. Treatment:  Shingles cannot be cured. The following medicines can decrease your pain and help prevent complications:  Antiviral medicine  fights the virus causing your shingles. Start this medicine within 3 days after you notice the first symptoms. This may help prevent nerve pain. A shingles outbreak can cause nerve pain called post-herpetic neuralgia (PHN). PHN can last a long time after you heal from shingles. Topical anesthetics  are used to numb the skin and decrease pain. They can be a cream, gel, spray, or patch. Anticonvulsants and antidepressants  decrease nerve pain and may help you sleep at night. Antihistamines  may help decrease itching. Acetaminophen  decreases pain and fever. It is available without a doctor's order. Ask how much to take and how often to take it. Follow directions. Read the labels of all other medicines you are using to see if they also contain acetaminophen, or ask your doctor or pharmacist. Acetaminophen can cause liver damage if not taken correctly. NSAIDs , such as ibuprofen, help decrease swelling, pain, and fever. This medicine is available with or without a doctor's order. NSAIDs can cause stomach bleeding or kidney problems in certain people. If you take blood thinner medicine, always ask your healthcare provider if NSAIDs are safe for you. Always read the medicine label and follow directions. A steroid and numbing medicine injection  may decrease severe pain that does not get better with other medicines. Self-care:   Apply a cool, wet compress  or take a cool bath. This may help decrease itching and pain. Keep your rash clean and dry. Cover your rash with a bandage. Do not use bandages with adhesive. Clothes may irritate your skin. Prevent the spread of the shingles virus:  The virus can be passed to a person who has never had chickenpox.  This usually happens if the other person comes in contact with your open sores. This person may get chickenpox, but not shingles. You are contagious until your blisters scab over. Stay away from people who have not had chickenpox or the chickenpox vaccine. Avoid pregnant women, newborns, and people with weak immune systems. They have a higher risk of infection. Wash your hands often. Wash your hands several times each day. Wash after you use the bathroom, change a child's diaper, and before you prepare or eat food. Use soap and water every time. Rub your soapy hands together, lacing your fingers. Wash the front and back of your hands, and in between your fingers. Use the fingers of one hand to scrub under the fingernails of the other hand. Wash for at least 20 seconds. Rinse with warm, running water for several seconds. Then dry your hands with a clean towel or paper towel. Use hand  that contains alcohol if soap and water are not available. Do not touch your eyes, nose, or mouth without washing your hands first.         Cover a sneeze or cough. Use a tissue that covers your mouth and nose. Throw the tissue away in a trash can right away. Use the bend of your arm if a tissue is not available. Wash your hands well with soap and water or use a hand . Prevent shingles or another shingles outbreak:   A vaccine may be given to help prevent shingles. You can get the vaccine even if you already had shingles. The vaccine comes in 2 forms. A 2-dose vaccine is usually given to adults 48 years or older. A 1-dose vaccine may be given to adults 61 years or older. The vaccine can help prevent a future outbreak. If you do get shingles again, the vaccine can keep it from becoming severe. Ask your healthcare provider about other vaccines you may need. Follow up with your doctor as directed:  Write down your questions so you remember to ask them during your visits.   For more information:   [facility]  [address] [place] [address] 22  Anthony Hubbard  Phone: 9- [phone]  Phone: [phone] [phone]  Web Address: [employer]    © Copyright [employer] 2022 Information is for End User's use only and may not be sold, redistributed or otherwise used for commercial purposes. The above information is an  only. It is not intended as medical advice for individual conditions or treatments. Talk to your doctor, nurse or pharmacist before following any medical regimen to see if it is safe and effective for you.

## 2023-07-31 NOTE — LETTER
2023    Jorge Farfan MD  [address] [facility] [address]    Patient: Tanya Blake   YOB: 1950   Date of Visit: 2023     Encounter Diagnosis     ICD-10-CM    1. Closed fracture of left tibial plateau with routine healing, subsequent encounter  S82.142D           Dear Dr. Echevarria Dates:    Thank you for your recent referral of Tanya Blake. Please review the attached evaluation summary from Saad's recent visit. Please verify that you agree with the plan of care by signing the attached order. If you have any questions or concerns, please do not hesitate to call. I sincerely appreciate the opportunity to share in the care of one of your patients and hope to have another opportunity to work with you in the near future. Sincerely,    Castro Muhammad, PT      Referring Provider:      I certify that I have read the below Plan of Care and certify the need for these services furnished under this plan of treatment while under my care. Jorge Farfan MD  [address] [facility] [address]  Via Fax: [phone]          PT Evaluation     Today's date: 2023  Patient name: Tanya Blake  : 1950  MRN: [id number]  Referring provider: Alphonso Crain PA-C  Dx:   Encounter Diagnosis     ICD-10-CM    1. Closed fracture of left tibial plateau with routine healing, subsequent encounter  S82.142D                      Assessment  Assessment details: Tanya Blake is a pleasant 71 y.o. male presenting to PT with cc of L knee stiffness and pain 2* closed proximal tibial plateau fx sustained . Pt. States he was finishing up appointment for cancer scan and slipped leaving building - he went to ER and received locked trom brace and crutches. Upon examination, patient was found to have objective deficits as listed below and is displaying ss consistent with knee hypomobility, quad atrophy, and joint effusion.  Pt. Is experiencing subsequent functional deficits including inability to bare weight in LLE, required A for ADLs, and inability to navigate stairs. Pt. Was educated on role of PT to address issues and given initial HEP. Pt. Would benefit from skilled physical therapy to promote improved function and maximize activity tolerance. Pt. Referred to [facility] for eval of L trunk rash consistent with shingles    Symptom irritability: high  Goals  ST weeks  -pt. Will demonstrate L knee prom flexion to 110*  -Pt. Will demonstrate L knee VMO contraction wnl and demonstrate SLR s lag    LT weeks  -Pt. Will demonstate gait pattern wnl  -Pt. Will demonstate L knee strength grossly 4+/5    Plan  Patient would benefit from: skilled physical therapy  Planned modality interventions: cryotherapy, high voltage pulsed current: pain management, high voltage pulsed current: spasm management and unattended electrical stimulation  Planned therapy interventions: abdominal trunk stabilization, ADL training, balance, balance/weight bearing training, body mechanics training, flexibility, functional ROM exercises, gait training, graded exercise, graded motor, home exercise program, therapeutic training, therapeutic exercise, therapeutic activities, stretching, strengthening, patient education, postural training, neuromuscular re-education, motor coordination training, Storey taping, manual therapy, kinesiology taping, joint mobilization and ADL retraining  Frequency: 2x week  Duration in weeks: 6  Plan of Care beginning date: 2023  Plan of Care expiration date: 2023  Treatment plan discussed with: patient and family        Subjective Evaluation    History of Present Illness  Mechanism of injury: Javier Schilling presents to PT with cc of L knee pain and stiffness 2* proximal tib plateau fracture sustained . Pt. Slipped and fell when leaving appointment and went immediately to er.  He was dx with fracture and given trom brace as well as crutches. Pt. F/u with ortho on  and has since been referred to PT. Pt. Is to remain NWB and progress ROM per PT.   Pt. Notes pain is improved in knee at rest, but is increased with any motion as well as walking. He is compliant with nwb. He feels crutches are going very slowly but he is able to use them . He has rash on L trunk that he is concerned about. Prior to injury patient was very active and walked 2 miles a day    Patient Goals  Patient goals for therapy: decreased pain, improved balance, increased motion, return to sport/leisure activities, independence with ADLs/IADLs, increased strength and decreased edema    Pain  Current pain ratin  At best pain ratin  At worst pain rating: 10  Location: l knee  Quality: tight and pressure  Relieving factors: ice and medications    Social Support  Steps to enter house: no  Stairs in house: no   Lives with: spouse    Employment status: not working    Diagnostic Tests  X-ray: abnormal  Treatments  Previous treatment: immobilization and medication  Current treatment: immobilization and medication        Objective     Observations   Left Knee   Positive for edema. Additional Observation Details  hemosiderin staining BLE    Noted L quad and calf atrophy    Noted effusion inferior l knee    Palpation     Additional Palpation Details  ttp along l medial joint line    Tenderness   Left Knee   Tenderness in the LCL (distal), LCL (proximal), MCL (distal), MCL (proximal), medial joint line, medial patella and patellar tendon.      Active Range of Motion   Left Knee   Flexion: 35 degrees   Extension: 3 degrees   Extensor lag: 3 degrees     Right Knee   Normal active range of motion    Passive Range of Motion   Left Knee   Flexion: 40 degrees     Swelling     Left Knee Girth Measurement (cm)   Joint line: 43 cm    Right Knee Girth Measurement (cm)   Joint line: 40 cm      Flowsheet Rows    Flowsheet Row Most Recent Value   PT/OT G-Codes    Current Score 4 Projected Score 55   FOTO information reviewed Yes            Precautions: CHF, NWB LLE      Daily Treatment Diary:      Initial Evaluation Date: 07/31/23  Compliance 7/31                     Visit Number 1                    Re-Eval  IE                    Foto Captured Y                           7/31                     Manual                      Retrograde massage -                     prom -                     Patellar mobz -                     Ther-Ex                      Bike -                     Quad sets 10x10"                     APs c bolster 20x                     Bolster stretch 3'                     Knee flexion stretch in supine 20x10"                     Heel slides  20x arom                     Heel slides c strap 20x                      Hamstring stretch -                     Prone knee bends - -                                         Neuro Re-Ed                                                                                                Ther-Act                                                               Modalities                      CP -

## 2023-08-07 ENCOUNTER — OFFICE VISIT (OUTPATIENT)
Dept: PHYSICAL THERAPY | Facility: CLINIC | Age: 73
End: 2023-08-07
Payer: MEDICARE

## 2023-08-07 DIAGNOSIS — S82.142D CLOSED FRACTURE OF LEFT TIBIAL PLATEAU WITH ROUTINE HEALING, SUBSEQUENT ENCOUNTER: Primary | ICD-10-CM

## 2023-08-07 PROCEDURE — 97112 NEUROMUSCULAR REEDUCATION: CPT | Performed by: PHYSICAL THERAPIST

## 2023-08-07 PROCEDURE — 97110 THERAPEUTIC EXERCISES: CPT | Performed by: PHYSICAL THERAPIST

## 2023-08-07 NOTE — PROGRESS NOTES
Daily Note     Today's date: 2023  Patient name: Lauren Naidu  : 1950  MRN: [id number]  Referring provider: Raf Amador  Dx:   Encounter Diagnosis     ICD-10-CM    1. Closed fracture of left tibial plateau with routine healing, subsequent encounter  S82.142D                      Subjective: Al notes knee is feeling better but continuesto have trouble with walking with crutches. Objective: See treatment diary below      Assessment: Lauren Naidu was prmogressed with PT interventions, focusing on appropriate technique and intensity. Pt. Required mod cuing from therapist to complete. Knee flexion improved to 90* post session. Pt. Would benefit from continued physical therapy to promote improved activity tolerance and function. Plan: Continue per plan of care. Progress treatment as tolerated.        Precautions: CHF, NWB LLE      Daily Treatment Diary:      Initial Evaluation Date: 23  Compliance                    Visit Number 1 2                   Re-Eval  IE -                MC   Foto Captured Y -                                             Manual                      Retrograde massage -  5'                   prom -                     Patellar mobz -  5'                   Ther-Ex                      Bike -  stimulus 10'                   Quad sets 10x10"  3' 5"/5"                   APs c bolster 20x  3'                   Bolster stretch 3'  3'                   Knee flexion stretch in supine 20x10"  20x5"                   Heel slides  20x arom  20x arom                   Heel slides c strap 20x   20x5"                   Hamstring stretch -  4x30"                   Prone knee bends -                    SLR -  2x10                   Neuro Re-Ed                                                                                                Ther-Act                                                               Modalities                      CP -

## 2023-08-14 ENCOUNTER — OFFICE VISIT (OUTPATIENT)
Dept: PHYSICAL THERAPY | Facility: CLINIC | Age: 73
End: 2023-08-14
Payer: MEDICARE

## 2023-08-14 ENCOUNTER — APPOINTMENT (OUTPATIENT)
Dept: LAB | Facility: CLINIC | Age: 73
End: 2023-08-14
Payer: MEDICARE

## 2023-08-14 DIAGNOSIS — S82.142D CLOSED FRACTURE OF LEFT TIBIAL PLATEAU WITH ROUTINE HEALING, SUBSEQUENT ENCOUNTER: Primary | ICD-10-CM

## 2023-08-14 PROCEDURE — 97110 THERAPEUTIC EXERCISES: CPT | Performed by: PHYSICAL THERAPIST

## 2023-08-14 PROCEDURE — 97112 NEUROMUSCULAR REEDUCATION: CPT | Performed by: PHYSICAL THERAPIST

## 2023-08-14 NOTE — PROGRESS NOTES
Daily Note     Today's date: 2023  Patient name: Isaak Trinidad  : 1950  MRN: [id number]  Referring provider: Luz Elena King  Dx:   Encounter Diagnosis     ICD-10-CM    1. Closed fracture of left tibial plateau with routine healing, subsequent encounter  S82.142D                      Subjective: Al returns to PT after MD folow up with Dr. Michelle Gillis. He notes MD discharged his brace use. He recommended use of BLE when standing still. Pt. Notes minimal pain but what he does have is in his quad mm. Objective: See treatment diary below      Assessment: Tolerated treatment well. Patient demonstrated fatigue post treatment, exhibited good technique with therapeutic exercises and would benefit from continued PT      Plan: Continue per plan of care. Progress treatment as tolerated.        Precautions: CHF, NWB LLE Tib plateau fx      Daily Treatment Diary:      Initial Evaluation Date: 23  Compliance                    Visit Number 1 2                   Re-Eval  IE -                MC   Foto Captured Y -                                             Manual                      Retrograde massage -  5'                   prom -                     Patellar mobz -  5'                   Ther-Ex                      Bike -  stimulus 10'  L1 12'                 Quad sets 10x10"  3' 5"/5"  3' 5"/5"                 APs c bolster 20x  3'  3'                 Bolster stretch 3'  3'  3'                 Knee flexion stretch in supine 20x10"  20x5"  20x5"                 Heel slides  20x arom  20x arom  20x                 Heel slides c strap 20x   20x5"                   Hamstring stretch -  4x30"                   Prone knee bends -  20x5"                 SLR -  2x10  3x10 flex, 2x10 abd                 Neuro Re-Ed                                                                                                Ther-Act                                                               Modalities CP -

## 2023-08-21 ENCOUNTER — APPOINTMENT (OUTPATIENT)
Dept: PHYSICAL THERAPY | Facility: CLINIC | Age: 73
End: 2023-08-21
Payer: MEDICARE

## 2023-08-22 ENCOUNTER — OFFICE VISIT (OUTPATIENT)
Dept: PHYSICAL THERAPY | Facility: CLINIC | Age: 73
End: 2023-08-22
Payer: MEDICARE

## 2023-08-22 DIAGNOSIS — S82.142D CLOSED FRACTURE OF LEFT TIBIAL PLATEAU WITH ROUTINE HEALING, SUBSEQUENT ENCOUNTER: Primary | ICD-10-CM

## 2023-08-22 PROCEDURE — 97110 THERAPEUTIC EXERCISES: CPT | Performed by: PHYSICAL THERAPIST

## 2023-08-22 PROCEDURE — 97112 NEUROMUSCULAR REEDUCATION: CPT | Performed by: PHYSICAL THERAPIST

## 2023-08-22 NOTE — PROGRESS NOTES
Daily Note     Today's date: 2023  Patient name: Reinaldo Karimi  : 1950  MRN: [id number]  Referring provider: Deena Kruger  Dx:   Encounter Diagnosis     ICD-10-CM    1. Closed fracture of left tibial plateau with routine healing, subsequent encounter  S82.142D                      Subjective: Al notes L knee remains swollen. Pain levels well controlled. Has a lot of questions on weight beraing and protocol as he was able to DC brace early per MD.       Objective: See treatment diary below      Assessment: discussed at length protocol for non op tib plateau fx. Pt. Questions answered. Due to early clearance of WB, anticipate patient progress from 2 crutches to 1 over next 2 weeks followed by WB progression as tolerated after that. Limited by strength and balance. Will continue to benefit form skileld PT to maximzie function and post injury recovery. Plan: Continue per plan of care. Progress treatment as tolerated. Progress treament per protocol.       Precautions: CHF, NWB LLE Tib plateau fx      Daily Treatment Diary:      Initial Evaluation Date: 23  Compliance                Visit Number 1 2    4               Re-Eval  IE -    -            MC   Foto Captured Y -    -                                     Manual                      Retrograde massage -  5'    5'               prom -                     Patellar mobz -  5'                   Ther-Ex                      Bike -  stimulus 10'  L1 12'  Nu steo L3 12'               Quad sets 10x10"  3' 5"/5"  3' 5"/5"  3' 5"/5"               APs c bolster 20x  3'  3'  3'               Bolster stretch 3'  3'  3'  3'               Knee flexion stretch in supine 20x10"  20x5"  20x5"  20x5"               Heel slides  20x arom  20x arom  20x  30x               Heel slides c strap 20x   20x5"                   Hamstring stretch -  4x30"    4x30"               Prone knee bends -  20x5"  20x5"               SLR -  2x10  3x10 flex, 2x10 abd  3x10 fex abd               Neuro Re-Ed                      Standing heel rises - - - 30x hep               Prone quad stes -  - 10x10"                                                Ther-Act                                                               Modalities                      CP -

## 2023-08-28 ENCOUNTER — APPOINTMENT (OUTPATIENT)
Dept: LAB | Facility: CLINIC | Age: 73
End: 2023-08-28
Payer: MEDICARE

## 2023-08-28 ENCOUNTER — OFFICE VISIT (OUTPATIENT)
Dept: PHYSICAL THERAPY | Facility: CLINIC | Age: 73
End: 2023-08-28
Payer: MEDICARE

## 2023-08-28 DIAGNOSIS — S82.142D CLOSED FRACTURE OF LEFT TIBIAL PLATEAU WITH ROUTINE HEALING, SUBSEQUENT ENCOUNTER: Primary | ICD-10-CM

## 2023-08-28 PROCEDURE — 97140 MANUAL THERAPY 1/> REGIONS: CPT | Performed by: PHYSICAL THERAPIST

## 2023-08-28 PROCEDURE — 97110 THERAPEUTIC EXERCISES: CPT | Performed by: PHYSICAL THERAPIST

## 2023-08-28 PROCEDURE — 97112 NEUROMUSCULAR REEDUCATION: CPT | Performed by: PHYSICAL THERAPIST

## 2023-08-28 NOTE — PROGRESS NOTES
Daily Note     Today's date: 2023  Patient name: Montez Panda  : 1950  MRN: [id number]  Referring provider: Selene Cartagena  Dx:   Encounter Diagnosis     ICD-10-CM    1. Closed fracture of left tibial plateau with routine healing, subsequent encounter  S82.142D                      Subjective: Al notes minimal pain or discomfort in knee. Has been compliant with hep. Objective: See treatment diary below      Assessment: Montez Panda was progressed with PT interventions, focusing on appropriate technique and intensity. Pt. Required mod cuing from therapist to complete. Trained with appropriate 50% weight bearing utilizing 1 AC. Pt. Would benefit from continued physical therapy to promote improved activity tolerance and function. Plan: Continue per plan of care. Progress treatment as tolerated.        Precautions: CHF, NWB LLE Tib plateau fx      Daily Treatment Diary:      Initial Evaluation Date: 23  Compliance              Visit Number 1 2    4  5             Re-Eval  IE -    -  -          MC   Foto Captured Y -    -  Y                                 Manual                      Retrograde massage -  5'    5'  10'             prom -                     Patellar mobz -  5'                   Ther-Ex                      Bike -  stimulus 10'  L1 12'  Nu steo L3 12' Nu step L3 12'             Quad sets 10x10"  3' 5"/5"  3' 5"/5"  3' 5"/5"  3' 5"/5"             APs c bolster 20x  3'  3'  3'  3'             Bolster stretch 3'  3'  3'  3'  3'             Knee flexion stretch in supine 20x10"  20x5"  20x5"  20x5"  20x5"             Heel slides  20x arom  20x arom  20x  30x  30x             Heel slides c strap 20x   20x5"      30x             Hamstring stretch -  4x30"    4x30"  4x30"             Prone knee bends -  20x5"  20x5"  20x5"             SLR -  2x10  3x10 flex, 2x10 abd  3x10 fex abd  3x10 flex and abd             Neuro Re-Ed Standing heel rises - - - 30x hep  30x             Prone quad stes -  - 10x10" 10x10"                                               Ther-Act                                                               Modalities                      CP -

## 2023-09-04 ENCOUNTER — APPOINTMENT (OUTPATIENT)
Dept: PHYSICAL THERAPY | Facility: CLINIC | Age: 73
End: 2023-09-04
Payer: MEDICARE

## 2023-09-05 ENCOUNTER — OFFICE VISIT (OUTPATIENT)
Dept: PHYSICAL THERAPY | Facility: CLINIC | Age: 73
End: 2023-09-05
Payer: MEDICARE

## 2023-09-05 DIAGNOSIS — S82.142D CLOSED FRACTURE OF LEFT TIBIAL PLATEAU WITH ROUTINE HEALING, SUBSEQUENT ENCOUNTER: Primary | ICD-10-CM

## 2023-09-05 PROCEDURE — 97110 THERAPEUTIC EXERCISES: CPT | Performed by: PHYSICAL THERAPIST

## 2023-09-05 PROCEDURE — 97112 NEUROMUSCULAR REEDUCATION: CPT | Performed by: PHYSICAL THERAPIST

## 2023-09-05 NOTE — PROGRESS NOTES
Daily Note     Today's date: 2023  Patient name: Salvador Barnes  : 1950  MRN: [id number]  Referring provider: Marcel Jacob  Dx:   Encounter Diagnosis     ICD-10-CM    1. Closed fracture of left tibial plateau with routine healing, subsequent encounter  S82.142D                      Subjective: ***      Objective: See treatment diary below      Assessment: Tolerated treatment {Tolerated treatment :[id number]}.  Patient {assessment:[id number]}      Plan: {PLAN:[id number]}     Precautions: CHF, NWB LLE Tib plateau fx      Daily Treatment Diary:      Initial Evaluation Date: 23  Compliance              Visit Number 1 2    4  5             Re-Eval  IE -    -  -          MC   Foto Captured Y -    -  Y                                 Manual                      Retrograde massage -  5'    5'  10'             prom -                     Patellar mobz -  5'                   Ther-Ex                      Bike -  stimulus 10'  L1 12'  Nu steo L3 12' Nu step L3 12'             Quad sets 10x10"  3' 5"/5"  3' 5"/5"  3' 5"/5"  3' 5"/5"             APs c bolster 20x  3'  3'  3'  3'             Bolster stretch 3'  3'  3'  3'  3'             Knee flexion stretch in supine 20x10"  20x5"  20x5"  20x5"  20x5"             Heel slides  20x arom  20x arom  20x  30x  30x             Heel slides c strap 20x   20x5"      30x             Hamstring stretch -  4x30"    4x30"  4x30"             Prone knee bends -  20x5"  20x5"  20x5"             SLR -  2x10  3x10 flex, 2x10 abd  3x10 fex abd  3x10 flex and abd             Neuro Re-Ed                      Standing heel rises - - - 30x hep  30x             Prone quad stes -  - 10x10" 10x10"                                               Ther-Act                                                               Modalities                      CP -

## 2023-09-05 NOTE — PROGRESS NOTES
PT Re-Evaluation     Today's date: 2023  Patient name: Jonna Ley  : 1950  MRN: [id number]  Referring provider: Randy Haddad MD  Dx:   Encounter Diagnosis     ICD-10-CM    1. Closed fracture of left tibial plateau with routine healing, subsequent encounter  S82.142D                      Assessment  Assessment details: Jonna Ley has continued with PT 2-3x/week, progressing as tolerated with treatment for Closed fracture of left tibial plateau with routine healing, subsequent encounter  (primary encounter diagnosis) 2023  Pt. Was recently cleared for WBAT with PT progression by MD.   . Pt. Is demonstrating improved objective measures and functional performance, but deficits remain and are limiting daily life. Pt. Would benefit from continued PT to further promote maximum activity tolerance and return to PLOF. Symptom irritability: highUnderstanding of Dx/Px/POC: good   Prognosis: good    Goals  ST weeks  -pt. Will demonstrate L knee prom flexion to 110* - met  -Pt. Will demonstrate L knee VMO contraction wnl and demonstrate SLR s lag -met    LT weeks  -Pt. Will demonstate gait pattern wnl - not met  -Pt.  Will demonstate L knee strength grossly 4+/5 - not met    Plan  Patient would benefit from: skilled physical therapy  Planned modality interventions: cryotherapy, high voltage pulsed current: pain management, high voltage pulsed current: spasm management and unattended electrical stimulation  Planned therapy interventions: abdominal trunk stabilization, ADL training, balance, balance/weight bearing training, body mechanics training, flexibility, functional ROM exercises, gait training, graded exercise, graded motor, home exercise program, therapeutic training, therapeutic exercise, therapeutic activities, stretching, strengthening, patient education, postural training, neuromuscular re-education, motor coordination training, Storey taping, manual therapy, kinesiology taping, joint mobilization and ADL retraining  Frequency: 2x week  Duration in weeks: 6  Plan of Care beginning date: 2023  Plan of Care expiration date: 10/17/2023  Treatment plan discussed with: patient and family        Subjective Evaluation    History of Present Illness  Mechanism of injury: Al is pleased with ongoing progress. Feels his knee swelling has much reduced. Notes weakness remains and he is apprehensive with walking on complant or uneven surfaces. He recently saw Dr. Farhan Mcclellan who cleared wbat. Patient Goals  Patient goals for therapy: decreased pain, improved balance, increased motion, return to sport/leisure activities, independence with ADLs/IADLs, increased strength and decreased edema    Pain  Current pain ratin  At best pain ratin  At worst pain ratin  Location: l knee  Quality: tight and pressure  Relieving factors: ice and medications    Social Support  Steps to enter house: no  Stairs in house: no   Lives with: spouse    Employment status: not working    Diagnostic Tests  X-ray: abnormal  Treatments  Previous treatment: immobilization and medication  Current treatment: immobilization and medication        Objective     Observations   Left Knee   Positive for edema. Additional Observation Details  hemosiderin staining BLE    Noted L quad and calf atrophy remains        Palpation     Additional Palpation Details  ttp along l medial joint line    Tenderness   Left Knee   Tenderness in the MCL (distal), MCL (proximal), medial joint line and medial patella. No tenderness in the LCL (distal), LCL (proximal) and patellar tendon.      Active Range of Motion   Left Knee   Flexion: 129 degrees   Extension: 0 degrees WFL  Extensor la degrees WFL    Right Knee   Normal active range of motion    Passive Range of Motion   Left Knee   Flexion: WFL  Extension: [facility]    Strength/Myotome Testing     Additional Strength Details  L Knee:  Flexion: 40#  Extension: 29#    R Knee:  Flexion: 56#  Extension: 58#    Swelling     Left Knee Girth Measurement (cm)   Joint line: 41 cm    Right Knee Girth Measurement (cm)   Joint line: 40 cm             Precautions: CHF, NWB LLE      Daily Treatment Diary:      Initial Evaluation Date: 7/31, 9/5 re  Compliance 9/5                     Visit Number 6                    Re-Eval  RE                 [facility]   Foto Captured Y                           9/5                     Manual                      Retrograde massage 5'                     prom -                     Patellar mobz -                     Ther-Ex                      Bike L2 10'                     UBE  5'/5' L1                     Squats 3x10                     Standing knee bends 10x ea                     Standing marches 20x                     Standing abductions 20x ea                     Heel rise/tke 30x                     SLR 4 way 10x ea                     saq 30x -                   laq 30x                     Neuro Re-Ed                      Step ups -                     Narrow dany c task nv            Narrow dany c ec -            Stance on ae -                         Ther-Act                                                               Modalities                      CP -

## 2023-09-05 NOTE — LETTER
2023    Sachin Manuel MD  [address] [address]  [address] [facility].    Patient: Carson Varghese   YOB: 1950   Date of Visit: 2023     Encounter Diagnosis     ICD-10-CM    1. Closed fracture of left tibial plateau with routine healing, subsequent encounter  S82142D           Dear Dr. Tanvi Osborn: Thank you for your recent referral of Carson Varghese. Please review the attached evaluation summary from Saad's recent visit. Please verify that you agree with the plan of care by signing the attached order. If you have any questions or concerns, please do not hesitate to call. I sincerely appreciate the opportunity to share in the care of one of your patients and hope to have another opportunity to work with you in the near future. Sincerely,    Balaji Barnhart, PT      Referring Provider:      I certify that I have read the below Plan of Care and certify the need for these services furnished under this plan of treatment while under my care. Sachin Manuel MD  [address] [address]  [address] [place] [place] [address]  Via Fax: [phone]          PT Re-Evaluation     Today's date: 2023  Patient name: Carson Varghese  : 1950  MRN: [id number]  Referring provider: Larisa Galarza MD  Dx:   Encounter Diagnosis     ICD-10-CM    1. Closed fracture of left tibial plateau with routine healing, subsequent encounter  S82.343D                      Assessment  Assessment details: Carson Varghese has continued with PT 2-3x/week, progressing as tolerated with treatment for Closed fracture of left tibial plateau with routine healing, subsequent encounter  (primary encounter diagnosis) 2023  Pt. Was recently cleared for WBAT with PT progression by MD.   . Pt. Is demonstrating improved objective measures and functional performance, but deficits remain and are limiting daily life. Pt.  Would benefit from continued PT to further promote maximum activity tolerance and return to PLOF. Symptom irritability: highUnderstanding of Dx/Px/POC: good   Prognosis: good    Goals  ST weeks  -pt. Will demonstrate L knee prom flexion to 110* - met  -Pt. Will demonstrate L knee VMO contraction wnl and demonstrate SLR s lag -met    LT weeks  -Pt. Will demonstate gait pattern wnl - not met  -Pt. Will demonstate L knee strength grossly 4+/5 - not met    Plan  Patient would benefit from: skilled physical therapy  Planned modality interventions: cryotherapy, high voltage pulsed current: pain management, high voltage pulsed current: spasm management and unattended electrical stimulation  Planned therapy interventions: abdominal trunk stabilization, ADL training, balance, balance/weight bearing training, body mechanics training, flexibility, functional ROM exercises, gait training, graded exercise, graded motor, home exercise program, therapeutic training, therapeutic exercise, therapeutic activities, stretching, strengthening, patient education, postural training, neuromuscular re-education, motor coordination training, Storey taping, manual therapy, kinesiology taping, joint mobilization and ADL retraining  Frequency: 2x week  Duration in weeks: 6  Plan of Care beginning date: 2023  Plan of Care expiration date: 10/17/2023  Treatment plan discussed with: patient and family        Subjective Evaluation    History of Present Illness  Mechanism of injury: Al is pleased with ongoing progress. Feels his knee swelling has much reduced. Notes weakness remains and he is apprehensive with walking on complant or uneven surfaces. He recently saw Dr. Tae haddad who cleared wbat.      Patient Goals  Patient goals for therapy: decreased pain, improved balance, increased motion, return to sport/leisure activities, independence with ADLs/IADLs, increased strength and decreased edema    Pain  Current pain ratin  At best pain ratin  At worst pain ratin  Location: l knee  Quality: tight and pressure  Relieving factors: ice and medications    Social Support  Steps to enter house: no  Stairs in house: no   Lives with: spouse    Employment status: not working    Diagnostic Tests  X-ray: abnormal  Treatments  Previous treatment: immobilization and medication  Current treatment: immobilization and medication        Objective     Observations   Left Knee   Positive for edema. Additional Observation Details  hemosiderin staining BLE    Noted L quad and calf atrophy remains        Palpation     Additional Palpation Details  ttp along l medial joint line    Tenderness   Left Knee   Tenderness in the MCL (distal), MCL (proximal), medial joint line and medial patella. No tenderness in the LCL (distal), LCL (proximal) and patellar tendon.      Active Range of Motion   Left Knee   Flexion: 129 degrees   Extension: 0 degrees WFL  Extensor la degrees WFL    Right Knee   Normal active range of motion    Passive Range of Motion   Left Knee   Flexion: WFL  Extension: [facility]    Strength/Myotome Testing     Additional Strength Details  L Knee:  Flexion: 40#  Extension: 29#    R Knee:  Flexion: 56#  Extension: 58#    Swelling     Left Knee Girth Measurement (cm)   Joint line: 41 cm    Right Knee Girth Measurement (cm)   Joint line: 40 cm            Precautions: CHF, NWB LLE      Daily Treatment Diary:      Initial Evaluation Date: ,  re  Compliance                      Visit Number 6                    Re-Eval  RE                 [facility]   Foto Captured Y                                                Manual                      Retrograde massage 5'                     prom -                     Patellar mobz -                     Ther-Ex                      Bike L2 10'                     UBE  5'/5' L1                     Squats 3x10                     Standing knee bends 10x ea                     Standing marches 20x                     Standing abductions 20x ea Heel rise/tke 30x                     SLR 4 way 10x ea                     saq 30x -                   laq 30x                     Neuro Re-Ed                      Step ups -                     Narrow dany c task nv            Narrow dany c ec -            Stance on ae -                         Ther-Act                                                               Modalities                      CP -

## 2023-09-11 ENCOUNTER — APPOINTMENT (OUTPATIENT)
Dept: PHYSICAL THERAPY | Facility: CLINIC | Age: 73
End: 2023-09-11
Payer: MEDICARE

## 2023-09-12 ENCOUNTER — OFFICE VISIT (OUTPATIENT)
Dept: PHYSICAL THERAPY | Facility: CLINIC | Age: 73
End: 2023-09-12
Payer: MEDICARE

## 2023-09-12 ENCOUNTER — APPOINTMENT (OUTPATIENT)
Dept: LAB | Facility: CLINIC | Age: 73
End: 2023-09-12
Payer: MEDICARE

## 2023-09-12 DIAGNOSIS — Z79.01 ENCOUNTER FOR CURRENT LONG-TERM USE OF ANTICOAGULANTS: ICD-10-CM

## 2023-09-12 DIAGNOSIS — S82.142D CLOSED FRACTURE OF LEFT TIBIAL PLATEAU WITH ROUTINE HEALING, SUBSEQUENT ENCOUNTER: Primary | ICD-10-CM

## 2023-09-12 LAB
INR PPP: 3.36 (ref 0.84–1.19)
PROTHROMBIN TIME: 34.3 SECONDS (ref 11.6–14.5)

## 2023-09-12 PROCEDURE — 85610 PROTHROMBIN TIME: CPT

## 2023-09-12 PROCEDURE — 36415 COLL VENOUS BLD VENIPUNCTURE: CPT

## 2023-09-12 PROCEDURE — 97110 THERAPEUTIC EXERCISES: CPT | Performed by: PHYSICAL THERAPIST

## 2023-09-12 PROCEDURE — 97112 NEUROMUSCULAR REEDUCATION: CPT | Performed by: PHYSICAL THERAPIST

## 2023-09-12 NOTE — PROGRESS NOTES
Daily Note     Today's date: 2023  Patient name: Yaritza Gilbert  : 1950  MRN: [id number]  Referring provider: Xiomara Mejia  Dx:   Encounter Diagnosis     ICD-10-CM    1. Closed fracture of left tibial plateau with routine healing, subsequent encounter  S82.142D                      Subjective: Al notes improving pain levels. He opted to not go on vacation due to lack of confidence with knee and walking. Objective: See treatment diary below      Assessment: Tolerated treatment well. Patient demonstrated fatigue post treatment, exhibited good technique with therapeutic exercises and would benefit from continued PT      Plan: Continue per plan of care. Progress treatment as tolerated.        Precautions: CHF, NWB LLE      Daily Treatment Diary:      Initial Evaluation Date: ,  re  Compliance                    Visit Number 6 7                   Re-Eval  RE -                [facility]   Foto Captured Y -                                             Manual                      Retrograde massage 5'                     prom -                     Patellar mobz -                     Ther-Ex                      Bike L2 10'  L2 10'                   UBE  5'/5' L1  5'/5' L1                   Squats 3x10  3x10                   Standing knee bends 10x ea  20x                   Standing marches 20x  30x                   Standing abductions 20x ea  30x                   Heel rise/tke 30x  30x                   SLR 4 way 10x ea  10x ea                   saq 30x -30x                   laq 30x  30x                   Neuro Re-Ed                      Step ups -  4" 10x ea                   Narrow dany c task nv -           Tandem dany c ec - 3x1'           Stance on ae - c ec 2x1'           Step taps - 2x1'           Ther-Act                                                               Modalities                      CP -

## 2023-09-18 ENCOUNTER — OFFICE VISIT (OUTPATIENT)
Dept: PHYSICAL THERAPY | Facility: CLINIC | Age: 73
End: 2023-09-18
Payer: MEDICARE

## 2023-09-18 DIAGNOSIS — S82.142D CLOSED FRACTURE OF LEFT TIBIAL PLATEAU WITH ROUTINE HEALING, SUBSEQUENT ENCOUNTER: Primary | ICD-10-CM

## 2023-09-18 PROCEDURE — 97530 THERAPEUTIC ACTIVITIES: CPT

## 2023-09-18 PROCEDURE — 97112 NEUROMUSCULAR REEDUCATION: CPT

## 2023-09-18 PROCEDURE — 97110 THERAPEUTIC EXERCISES: CPT

## 2023-09-18 NOTE — PROGRESS NOTES
Daily Note     Today's date: 2023  Patient name: Davidson Mckenzie  : 1950  MRN: [id number]  Referring provider: Rosamaria Gitelman  Dx:   Encounter Diagnosis     ICD-10-CM    1. Closed fracture of left tibial plateau with routine healing, subsequent encounter  S82.142D                      Subjective: Patient states he is concerned if he is walking correctly. Objective: See treatment diary below      Assessment: Tolerated treatment well without complaint other than fatigue. Some verbal cues required to perform exercises with appropriate technique and intensity. Gait training with SPC cane today- patient ambulates with rigidity in pelvis causing increased toe out and decreased step length. PT and PTA provided cues for increased pelvic rotation and this was tolerated well. Patient would benefit from continued PT to increase B LE strength and balance for improved function and gait. Plan: Continue per plan of care.       Precautions: CHF, NWB LLE      Daily Treatment Diary:      Initial Evaluation Date: ,  re  Compliance                  Visit Number 6 7  8                 Re-Eval  RE -                [facility]   Foto Captured Y -                                           Manual                      Retrograde massage 5'                     prom -                     Patellar mobz -                     Ther-Ex                      Bike L2 10'  L2 10'  L2 10'                 UBE  5'/5' L1  5'/5' L1  5'/5' L1                 Squats 3x10  3x10  3x10                 Standing knee bends 10x ea  20x  30x                 Standing marches 20x  30x  20x 0HHA                 Standing abductions 20x ea  30x  30x                 Heel rise/tke 30x  30x  30x                 SLR 4 way 10x ea  10x ea  -                 saq 30x -30x  -                 laq 30x  30x 30x                  Neuro Re-Ed                      Step ups -  4" 10x ea 4" 10x ea                 Narrow dany c task nv -           Tandem dany c ec - 3x1' 2x1'          Stance on ae - c ec 2x1' -          Step taps  2x1' 2x1'          Biodex   Random firm base L2 1'x2                       Ther-Act              gait c spc     cues for rot of pelvis                                            Modalities                      CP -

## 2023-09-25 ENCOUNTER — OFFICE VISIT (OUTPATIENT)
Dept: PHYSICAL THERAPY | Facility: CLINIC | Age: 73
End: 2023-09-25
Payer: MEDICARE

## 2023-09-25 DIAGNOSIS — S82.142D CLOSED FRACTURE OF LEFT TIBIAL PLATEAU WITH ROUTINE HEALING, SUBSEQUENT ENCOUNTER: Primary | ICD-10-CM

## 2023-09-25 PROCEDURE — 97530 THERAPEUTIC ACTIVITIES: CPT

## 2023-09-25 PROCEDURE — 97110 THERAPEUTIC EXERCISES: CPT

## 2023-09-25 PROCEDURE — 97112 NEUROMUSCULAR REEDUCATION: CPT

## 2023-09-25 NOTE — PROGRESS NOTES
Daily Note     Today's date: 2023  Patient name: Greg Umanzor  : 1950  MRN: [id number]  Referring provider: Wing Gutierrez  Dx:   Encounter Diagnosis     ICD-10-CM    1. Closed fracture of left tibial plateau with routine healing, subsequent encounter  S82.142D                      Subjective: Patient reports L LE is doing great. He has no pain this am.      Objective: See treatment diary below      Assessment: Patient presents to clinic ambulating well with no AD. Tolerated treatment well without complaint. Able to progress much of standing TE program to 1# resistance. Patient would benefit from continued PT to increase L LE strength and balance for improved function and gait. Plan: Continue per plan of care.       Precautions: CHF, NWB LLE      Daily Treatment Diary:      Initial Evaluation Date: ,  re  Compliance                Visit Number 6 7  8  9               Re-Eval  RE -                [facility]   Foto Captured Y -                                         Manual                      Retrograde massage 5'                     prom -                     Patellar mobz -                     Ther-Ex                      Bike L2 10'  L2 10'  L2 10'  L2 10'               UBE  5'/5' L1  5'/5' L1  5'/5' L1  5'/5' L1               Squats 3x10  3x10  3x10  3x10               Standing knee bends 10x ea  20x  30x  1# 30x B               Standing marches 20x  30x  20x 0HHA  1# 30x B 0HHA               Standing abductions 20x ea  30x  30x  1# 30x B               Heel rise/tke 30x  30x  30x  30x               SLR 4 way 10x ea  10x ea  -                 saq 30x -30x  -                 laq 30x  30x 30x   1# 30x               Neuro Re-Ed                      Step ups -  4" 10x ea 4" 10x ea 4" 10x ea                Narrow dnay c task nv -           Tandem dany c ec - 3x1' 2x1' 2x1'         Stance on ae - c ec 2x1' -          Step taps  2x1' 2x1' 2x1' Biodex   Random firm base L2 1'x2 Random moveable base L4 1'x2                      Ther-Act              gait c spc     cues for rot of pelvis  cues for heel toe and rot pel                                          Modalities                      CP -

## 2023-10-02 ENCOUNTER — OFFICE VISIT (OUTPATIENT)
Dept: PHYSICAL THERAPY | Facility: CLINIC | Age: 73
End: 2023-10-02
Payer: MEDICARE

## 2023-10-02 DIAGNOSIS — S82.142D CLOSED FRACTURE OF LEFT TIBIAL PLATEAU WITH ROUTINE HEALING, SUBSEQUENT ENCOUNTER: Primary | ICD-10-CM

## 2023-10-02 PROCEDURE — 97112 NEUROMUSCULAR REEDUCATION: CPT | Performed by: PHYSICAL THERAPIST

## 2023-10-02 PROCEDURE — 97110 THERAPEUTIC EXERCISES: CPT | Performed by: PHYSICAL THERAPIST

## 2023-10-02 NOTE — PROGRESS NOTES
Daily Note     Today's date: 10/2/2023  Patient name: Ashley Hsu  : 1950  MRN: [id number]  Referring provider: Regina Savage  Dx:   Encounter Diagnosis     ICD-10-CM    1. Closed fracture of left tibial plateau with routine healing, subsequent encounter  S82.142D                      Subjective: Al notes feeling really good today. Is confident with steps and is ready to progress them. Objective: See treatment diary below      Assessment: Tolerated treatment well. Patient demonstrated fatigue post treatment, exhibited good technique with therapeutic exercises and would benefit from continued PT      Plan: Continue per plan of care. Progress treatment as tolerated.        Precautions: CHF, NWB LLE      Daily Treatment Diary:      Initial Evaluation Date: ,  re  Compliance 9/5  9/12  9/18  9/25  10/2             Visit Number 6 7  8  9  10             Re-Eval  RE -      -          [facility]   Foto Captured Y -      -                    9/5  9/12  9/18  9/25  10/2             Manual                      Retrograde massage 5'                     prom -                     Patellar mobz -                     Ther-Ex                      Bike L2 10'  L2 10'  L2 10'  L2 10'  L2 12'             UBE  5'/5' L1  5'/5' L1  5'/5' L1  5'/5' L1  5'/5' L1             Squats 3x10  3x10  3x10  3x10  3x10"             Standing knee bends 10x ea  20x  30x  1# 30x B  1# 30x             Standing marches 20x  30x  20x 0HHA  1# 30x B 0HHA  1# 30x             Standing abductions 20x ea  30x  30x  1# 30x B  1# 30x             Heel rise/tke 30x  30x  30x  30x  30x             SLR 4 way 10x ea  10x ea  -    30x ea             saq 30x -30x  -    20x             laq 30x  30x 30x   1# 30x  1# 30x             Neuro Re-Ed                      Step ups -  4" 10x ea 4" 10x ea 4" 10x ea   8" 20x ea             Narrow dany c task nv -           Tandem dany c ec - 3x1' 2x1' 2x1' 2x1'        Stance on ae - c ec 2x1' -          Step taps  2x1' 2x1' 2x1' 2x1'        Biodex   Random firm base L2 1'x2 Random moveable base L4 1'x2 nv                     Ther-Act              gait c spc     cues for rot of pelvis  cues for heel toe and rot pel  10'                                        Modalities                      CP -

## 2023-10-09 ENCOUNTER — APPOINTMENT (OUTPATIENT)
Dept: LAB | Facility: CLINIC | Age: 73
End: 2023-10-09
Payer: MEDICARE

## 2023-10-09 ENCOUNTER — OFFICE VISIT (OUTPATIENT)
Dept: PHYSICAL THERAPY | Facility: CLINIC | Age: 73
End: 2023-10-09
Payer: MEDICARE

## 2023-10-09 DIAGNOSIS — I25.110 ATHEROSCLEROSIS OF NATIVE CORONARY ARTERY WITH UNSTABLE ANGINA PECTORIS, UNSPECIFIED WHETHER NATIVE OR TRANSPLANTED HEART (HCC): ICD-10-CM

## 2023-10-09 DIAGNOSIS — E87.6 HYPOKALEMIA: ICD-10-CM

## 2023-10-09 DIAGNOSIS — Z79.01 ENCOUNTER FOR CURRENT LONG-TERM USE OF ANTICOAGULANTS: ICD-10-CM

## 2023-10-09 DIAGNOSIS — S82.142D CLOSED FRACTURE OF LEFT TIBIAL PLATEAU WITH ROUTINE HEALING, SUBSEQUENT ENCOUNTER: Primary | ICD-10-CM

## 2023-10-09 DIAGNOSIS — R35.1 NOCTURIA: ICD-10-CM

## 2023-10-09 DIAGNOSIS — E78.5 HYPERLIPIDEMIA, UNSPECIFIED HYPERLIPIDEMIA TYPE: ICD-10-CM

## 2023-10-09 DIAGNOSIS — E55.9 VITAMIN D DEFICIENCY: ICD-10-CM

## 2023-10-09 DIAGNOSIS — D64.9 ANEMIA, UNSPECIFIED TYPE: ICD-10-CM

## 2023-10-09 LAB
25(OH)D3 SERPL-MCNC: 27.6 NG/ML (ref 30–100)
ALBUMIN SERPL BCP-MCNC: 2.6 G/DL (ref 3.5–5)
ALP SERPL-CCNC: 90 U/L (ref 34–104)
ALT SERPL W P-5'-P-CCNC: 39 U/L (ref 7–52)
ANION GAP SERPL CALCULATED.3IONS-SCNC: 6 MMOL/L
AST SERPL W P-5'-P-CCNC: 39 U/L (ref 13–39)
BASOPHILS # BLD AUTO: 0.05 THOUSANDS/ÂΜL (ref 0–0.1)
BASOPHILS NFR BLD AUTO: 1 % (ref 0–1)
BILIRUB SERPL-MCNC: 0.51 MG/DL (ref 0.2–1)
BUN SERPL-MCNC: 28 MG/DL (ref 5–25)
CALCIUM ALBUM COR SERPL-MCNC: 9.8 MG/DL (ref 8.3–10.1)
CALCIUM SERPL-MCNC: 8.7 MG/DL (ref 8.4–10.2)
CHLORIDE SERPL-SCNC: 110 MMOL/L (ref 96–108)
CHOLEST SERPL-MCNC: 111 MG/DL
CO2 SERPL-SCNC: 30 MMOL/L (ref 21–32)
CREAT SERPL-MCNC: 1.26 MG/DL (ref 0.6–1.3)
EOSINOPHIL # BLD AUTO: 0.14 THOUSAND/ÂΜL (ref 0–0.61)
EOSINOPHIL NFR BLD AUTO: 3 % (ref 0–6)
ERYTHROCYTE [DISTWIDTH] IN BLOOD BY AUTOMATED COUNT: 15.1 % (ref 11.6–15.1)
GFR SERPL CREATININE-BSD FRML MDRD: 56 ML/MIN/1.73SQ M
GLUCOSE P FAST SERPL-MCNC: 115 MG/DL (ref 65–99)
HCT VFR BLD AUTO: 42.3 % (ref 36.5–49.3)
HDLC SERPL-MCNC: 40 MG/DL
HGB BLD-MCNC: 13.5 G/DL (ref 12–17)
IMM GRANULOCYTES # BLD AUTO: 0.01 THOUSAND/UL (ref 0–0.2)
IMM GRANULOCYTES NFR BLD AUTO: 0 % (ref 0–2)
INR PPP: 1.71 (ref 0.84–1.19)
LDLC SERPL CALC-MCNC: 54 MG/DL (ref 0–100)
LYMPHOCYTES # BLD AUTO: 0.74 THOUSANDS/ÂΜL (ref 0.6–4.47)
LYMPHOCYTES NFR BLD AUTO: 14 % (ref 14–44)
MCH RBC QN AUTO: 30.8 PG (ref 26.8–34.3)
MCHC RBC AUTO-ENTMCNC: 31.9 G/DL (ref 31.4–37.4)
MCV RBC AUTO: 96 FL (ref 82–98)
MONOCYTES # BLD AUTO: 0.41 THOUSAND/ÂΜL (ref 0.17–1.22)
MONOCYTES NFR BLD AUTO: 8 % (ref 4–12)
NEUTROPHILS # BLD AUTO: 3.8 THOUSANDS/ÂΜL (ref 1.85–7.62)
NEUTS SEG NFR BLD AUTO: 74 % (ref 43–75)
NONHDLC SERPL-MCNC: 71 MG/DL
NRBC BLD AUTO-RTO: 0 /100 WBCS
PLATELET # BLD AUTO: 213 THOUSANDS/UL (ref 149–390)
PMV BLD AUTO: 11 FL (ref 8.9–12.7)
POTASSIUM SERPL-SCNC: 4.7 MMOL/L (ref 3.5–5.3)
PROT SERPL-MCNC: 4.9 G/DL (ref 6.4–8.4)
PROTHROMBIN TIME: 19.8 SECONDS (ref 11.6–14.5)
PSA SERPL-MCNC: 0.96 NG/ML (ref 0–4)
RBC # BLD AUTO: 4.39 MILLION/UL (ref 3.88–5.62)
SODIUM SERPL-SCNC: 146 MMOL/L (ref 135–147)
TRIGL SERPL-MCNC: 83 MG/DL
TSH SERPL DL<=0.05 MIU/L-ACNC: 3.25 UIU/ML (ref 0.45–4.5)
WBC # BLD AUTO: 5.15 THOUSAND/UL (ref 4.31–10.16)

## 2023-10-09 PROCEDURE — 36415 COLL VENOUS BLD VENIPUNCTURE: CPT

## 2023-10-09 PROCEDURE — 80053 COMPREHEN METABOLIC PANEL: CPT

## 2023-10-09 PROCEDURE — 97110 THERAPEUTIC EXERCISES: CPT | Performed by: PHYSICAL THERAPIST

## 2023-10-09 PROCEDURE — 84153 ASSAY OF PSA TOTAL: CPT

## 2023-10-09 PROCEDURE — 82306 VITAMIN D 25 HYDROXY: CPT

## 2023-10-09 PROCEDURE — 97112 NEUROMUSCULAR REEDUCATION: CPT | Performed by: PHYSICAL THERAPIST

## 2023-10-09 PROCEDURE — 85610 PROTHROMBIN TIME: CPT

## 2023-10-09 PROCEDURE — 80061 LIPID PANEL: CPT

## 2023-10-09 PROCEDURE — 85025 COMPLETE CBC W/AUTO DIFF WBC: CPT

## 2023-10-09 PROCEDURE — 84443 ASSAY THYROID STIM HORMONE: CPT

## 2023-10-09 NOTE — PROGRESS NOTES
Daily Note     Today's date: 10/9/2023  Patient name: Jose Downey  : 1950  MRN: [id number]  Referring provider: Maribel Watson  Dx:   Encounter Diagnosis     ICD-10-CM    1. Closed fracture of left tibial plateau with routine healing, subsequent encounter  S82.142D                      Subjective: Al notes minimal pain in knee today. Remains motivated with PT. Feels balance is primary issue. Also notes endurance is concerning. Objective: See treatment diary below      Assessment: Tolerated treatment well. Patient demonstrated fatigue post treatment, exhibited good technique with therapeutic exercises and would benefit from continued PT      Plan: Continue per plan of care. Progress treatment as tolerated.        Precautions: CHF, NWB LLE      Daily Treatment Diary:      Initial Evaluation Date: ,  re  Compliance 9/5  9/12  9/18  9/25  10/2  10/9           Visit Number 6 7  8  9  10  11           Re-Eval  RE -      -  -        [facility]   Foto Captured Y -      -  -                  9/5  9/12  9/18  9/25  10/2  10/9           Manual                      Retrograde massage 5'                     prom -                     Patellar mobz -                     Ther-Ex                      Bike L2 10'  L2 10'  L2 10'  L2 10'  L2 12'  L2 12'           UBE  5'/5' L1  5'/5' L1  5'/5' L1  5'/5' L1  5'/5' L1  5'/5' L2           Squats 3x10  3x10  3x10  3x10  3x10"  3x10"           Standing knee bends 10x ea  20x  30x  1# 30x B  1# 30x  1# 30x           Standing marches 20x  30x  20x 0HHA  1# 30x B 0HHA  1# 30x  1# 30x           Standing abductions 20x ea  30x  30x  1# 30x B  1# 30x  1# 30x           Heel rise/tke 30x  30x  30x  30x  30x  30x           SLR 4 way 10x ea  10x ea  -    30x ea  30x           saq 30x -30x  -    20x  20x           laq 30x  30x 30x   1# 30x  1# 30x  1# 30x           Neuro Re-Ed                      Step ups -  4" 10x ea 4" 10x ea 4" 10x ea   8" 20x ea  8" 30x           Narrow dany c task nv -           Tandem dany c ec - 3x1' 2x1' 2x1' 2x1' 2x1'       Stance on ae - c ec 2x1' -          Step taps  2x1' 2x1' 2x1' 2x1' 2x1'       Biodex   Random firm base L2 1'x2 Random moveable base L4 1'x2 nv                     Ther-Act              gait c spc     cues for rot of pelvis  cues for heel toe and rot pel  10'  10'                                      Modalities                      CP -

## 2023-10-16 ENCOUNTER — EVALUATION (OUTPATIENT)
Dept: PHYSICAL THERAPY | Facility: CLINIC | Age: 73
End: 2023-10-16
Payer: MEDICARE

## 2023-10-16 DIAGNOSIS — S82.142D CLOSED FRACTURE OF LEFT TIBIAL PLATEAU WITH ROUTINE HEALING, SUBSEQUENT ENCOUNTER: Primary | ICD-10-CM

## 2023-10-16 PROCEDURE — 97110 THERAPEUTIC EXERCISES: CPT | Performed by: PHYSICAL THERAPIST

## 2023-10-16 PROCEDURE — 97112 NEUROMUSCULAR REEDUCATION: CPT | Performed by: PHYSICAL THERAPIST

## 2023-10-16 NOTE — PROGRESS NOTES
PT Re-Evaluation     Today's date: 10/16/2023  Patient name: Kaleb Morgan  : 1950  MRN: [id number]  Referring provider: Kristi Vasquez MD  Dx:   Encounter Diagnosis     ICD-10-CM    1. Closed fracture of left tibial plateau with routine healing, subsequent encounter  S82.142D                      Assessment  Assessment details: Kaleb Morgan has continued with PT 2-3x/week, progressing as tolerated with treatment for Closed fracture of left tibial plateau with routine healing, subsequent encounter  (primary encounter diagnosis) 2023  To this point, he continues to show steady improvement in strength as well as SL and narrow JUNE balance. Subjective lack of balance appears more vestibular related. Discussed progress and patient agrees to 4 more weeks of therapy to further progress strength and stability reducing fall risk and improving ambulation/activity tolerance. Symptom irritability: lowUnderstanding of Dx/Px/POC: good   Prognosis: good    Goals  ST weeks  -pt. Will demonstrate L knee prom flexion to 110* - met  -Pt. Will demonstrate L knee VMO contraction wnl and demonstrate SLR s lag -met    LT weeks  -Pt. Will demonstate gait pattern wnl - met  -Pt.  Will demonstate L knee strength grossly 4+/5 - met    Plan  Patient would benefit from: skilled physical therapy  Planned modality interventions: cryotherapy, high voltage pulsed current: pain management, high voltage pulsed current: spasm management and unattended electrical stimulation  Planned therapy interventions: abdominal trunk stabilization, ADL training, balance, balance/weight bearing training, body mechanics training, flexibility, functional ROM exercises, gait training, graded exercise, graded motor, home exercise program, therapeutic training, therapeutic exercise, therapeutic activities, stretching, strengthening, patient education, postural training, neuromuscular re-education, motor coordination training, [facility] taping, manual therapy, kinesiology taping, joint mobilization and ADL retraining  Frequency: 2x week  Duration in weeks: 6  Plan of Care beginning date: 10/16/2023  Plan of Care expiration date: 2023  Treatment plan discussed with: patient and family        Subjective Evaluation    History of Present Illness  Mechanism of injury: Al feels his last month of PT went very well. He is hoping to continue with therapy as he feels 85% returned to normal     Patient Goals  Patient goals for therapy: decreased pain, improved balance, increased motion, return to sport/leisure activities, independence with ADLs/IADLs, increased strength and decreased edema    Pain  Current pain ratin  At best pain ratin  At worst pain ratin  Location: l knee  Quality: tight and pressure  Relieving factors: ice and medications    Social Support  Steps to enter house: no  Stairs in house: no   Lives with: spouse    Employment status: not working    Diagnostic Tests  X-ray: abnormal  Treatments  Previous treatment: immobilization and medication  Current treatment: immobilization and medication        Objective     Observations     Additional Observation Details  hemosiderin staining BLE    Noted L quad and calf atrophy remains but is improving        Palpation     Additional Palpation Details  No ttp reported    Tenderness   Left Knee   Tenderness in the MCL (proximal). No tenderness in the LCL (distal), LCL (proximal), MCL (distal), medial joint line, medial patella and patellar tendon.      Active Range of Motion   Left Knee   Flexion: WFL  Extension: 0 degrees WFL  Extensor la degrees WFL    Right Knee   Normal active range of motion    Passive Range of Motion   Left Knee   Flexion: WFL  Extension: [facility]    Strength/Myotome Testing     Additional Strength Details  L Knee:  Flexion: 50#  Extension: 51#    R Knee:  Flexion: 56#  Extension: 58#    Tests     Additional Tests Details  TU"  5x sit to stand: 15"  KITTY: see media  Bilateral comparison (20% impairment on L)    Swelling     Left Knee Girth Measurement (cm)   Joint line: 41 cm    Right Knee Girth Measurement (cm)   Joint line: 40 cm             Precautions: CHF, NWB LLE      Daily Treatment Diary:      Initial Evaluation Date: 7/31, 9/5 re, 10/16 re  Compliance 10/16                     Visit Number 12                    Re-Eval  RE                 [facility]   Foto Captured Y                           10/16                     Manual                      Retrograde massage -                     prom -                     Patellar mobz -                     Ther-Ex                      Bike L2 10'                     UBE  5'/5' L1                     Squats 3x10                     Standing knee bends 10x ea                     Standing marches 20x                     Standing abductions 20x ea                     Heel rise/tke 30x                     Step ups 3-x 8"                     Step downs  20x 6" -                                         Neuro Re-Ed                      Step ups -                     Narrow dany c task Toe touch on step 30x            Narrow dany c ec -            Stance on ae 3x1'                         Ther-Act                                                               Modalities                      CP -

## 2023-10-16 NOTE — LETTER
2023    [address] [place] [address]    Patient: Salvador Barnes   YOB: 1950   Date of Visit: 10/16/2023     Encounter Diagnosis     ICD-10-CM    1. Closed fracture of left tibial plateau with routine healing, subsequent encounter  S82.142D           Dear Dr. Nidia Tenorio: Thank you for your recent referral of Salvador Barnes. Please review the attached evaluation summary from Saad's recent visit. Please verify that you agree with the plan of care by signing the attached order. If you have any questions or concerns, please do not hesitate to call. I sincerely appreciate the opportunity to share in the care of one of your patients and hope to have another opportunity to work with you in the near future. Sincerely,    Tory Hart, PT      Referring Provider:      I certify that I have read the below Plan of Care and certify the need for these services furnished under this plan of treatment while under my care. [address] [place] [address]  Via Fax: [phone]          PT Re-Evaluation     Today's date: 10/16/2023  Patient name: Salvador Barnes  : 1950  MRN: [id number]  Referring provider: Brendan Morales MD  Dx:   Encounter Diagnosis     ICD-10-CM    1. Closed fracture of left tibial plateau with routine healing, subsequent encounter  S82.142D                      Assessment  Assessment details: Salvador Barnes has continued with PT 2-3x/week, progressing as tolerated with treatment for Closed fracture of left tibial plateau with routine healing, subsequent encounter  (primary encounter diagnosis) 2023  To this point, he continues to show steady improvement in strength as well as SL and narrow JUNE balance. Subjective lack of balance appears more vestibular related.  Discussed progress and patient agrees to 4 more weeks of therapy to further progress strength and stability reducing fall risk and improving ambulation/activity tolerance. Symptom irritability: lowUnderstanding of Dx/Px/POC: good   Prognosis: good    Goals  ST weeks  -pt. Will demonstrate L knee prom flexion to 110* - met  -Pt. Will demonstrate L knee VMO contraction wnl and demonstrate SLR s lag -met    LT weeks  -Pt. Will demonstate gait pattern wnl - met  -Pt. Will demonstate L knee strength grossly 4+/5 - met    Plan  Patient would benefit from: skilled physical therapy  Planned modality interventions: cryotherapy, high voltage pulsed current: pain management, high voltage pulsed current: spasm management and unattended electrical stimulation  Planned therapy interventions: abdominal trunk stabilization, ADL training, balance, balance/weight bearing training, body mechanics training, flexibility, functional ROM exercises, gait training, graded exercise, graded motor, home exercise program, therapeutic training, therapeutic exercise, therapeutic activities, stretching, strengthening, patient education, postural training, neuromuscular re-education, motor coordination training, [facility] taping, manual therapy, kinesiology taping, joint mobilization and ADL retraining  Frequency: 2x week  Duration in weeks: 6  Plan of Care beginning date: 10/16/2023  Plan of Care expiration date: 2023  Treatment plan discussed with: patient and family        Subjective Evaluation    History of Present Illness  Mechanism of injury: Al feels his last month of PT went very well.  He is hoping to continue with therapy as he feels 85% returned to normal     Patient Goals  Patient goals for therapy: decreased pain, improved balance, increased motion, return to sport/leisure activities, independence with ADLs/IADLs, increased strength and decreased edema    Pain  Current pain ratin  At best pain ratin  At worst pain ratin  Location: l knee  Quality: tight and pressure  Relieving factors: ice and medications    Social Support  Steps to enter house: no  Stairs in house: no   Lives with: spouse    Employment status: not working    Diagnostic Tests  X-ray: abnormal  Treatments  Previous treatment: immobilization and medication  Current treatment: immobilization and medication        Objective     Observations     Additional Observation Details  hemosiderin staining BLE    Noted L quad and calf atrophy remains but is improving        Palpation     Additional Palpation Details  No ttp reported    Tenderness   Left Knee   Tenderness in the MCL (proximal). No tenderness in the LCL (distal), LCL (proximal), MCL (distal), medial joint line, medial patella and patellar tendon.      Active Range of Motion   Left Knee   Flexion: WFL  Extension: 0 degrees WFL  Extensor la degrees WFL    Right Knee   Normal active range of motion    Passive Range of Motion   Left Knee   Flexion: WFL  Extension: [facility] [employer]    Strength/Myotome Testing     Additional Strength Details  L Knee:  Flexion: 50#  Extension: 51#    R Knee:  Flexion: 56#  Extension: 58#    Tests     Additional Tests Details  TU"  5x sit to stand: 15"  KITTY: see media  Bilateral comparison (20% impairment on L)    Swelling     Left Knee Girth Measurement (cm)   Joint line: 41 cm    Right Knee Girth Measurement (cm)   Joint line: 40 cm             Precautions: CHF, NWB LLE      Daily Treatment Diary:      Initial Evaluation Date: ,  re, 10/16 re  Compliance 10/16                     Visit Number 12                    Re-Eval  RE                 [facility]   Foto Captured Y                           10/16                     Manual                      Retrograde massage -                     prom -                     Patellar mobz -                     Ther-Ex                      Bike L2 10'                     UBE  5'/5' L1                     Squats 3x10                     Standing knee bends 10x ea                     Standing marches 20x                     Standing abductions 20x ea Heel rise/tke 30x                     Step ups 3-x 8"                     Step downs  20x 6" -                                         Neuro Re-Ed                      Step ups -                     Narrow dany c task Toe touch on step 30x            Narrow dany c ec -            Stance on ae 3x1'                         Ther-Act                                                               Modalities                      CP -

## 2023-10-27 NOTE — PROGRESS NOTES
Daily Note     Today's date: 10/30/2023  Patient name: Corrinne Shiver  : 1950  MRN: [id number]  Referring provider: Chelsea Brunner  Dx:   Encounter Diagnosis     ICD-10-CM    1. Closed fracture of left tibial plateau with routine healing, subsequent encounter  S82.142D                      Subjective: The patient states that he is doing good today. No complaints of pain at start of session. Objective: See treatment diary below      Assessment: Tolerated treatment well with no increase in leg/knee pain during session. He did have LBP with forward step ups on 8" step, only able to complete a few reps of this exercise. Added TKE w/Tband today to help improve quad strength. He would benefit from program of progressive strengthening and balance to help improve function. Patient would benefit from continued PT      Plan: Continue per plan of care.       Precautions: CHF, NWB LLE      Daily Treatment Diary:      Initial Evaluation Date: ,  re, 10/16 re  Compliance 10/16  10/30                   Visit Number 12 15                   Re-Eval  RE -                MC   Foto Captured Y -                          10/16  10/30                   Manual                      Retrograde massage -                     prom -                     Patellar mobz -                     Ther-Ex                      Bike L2 10'  L2 10'                   UBE  5'/5' L1  5'/5' L1                   Squats 3x10  3x10                   Standing knee bends 10x ea 2x10 ea                   Standing marches 20x  20x                   Standing abductions 20x ea  20x ea                   Heel rise/tke 30x  30x  HR on foam                   Step ups 30x 8"  5x 8"                   Step downs  20x 6" -                   TKE w/TBand  Blue 2x10 Reddy                   Neuro Re-Ed                      Step ups -                     Narrow dany c task Toe touch on step 30x Toe touch on 6" step 30x           Narrow dany c ec - Stance on ae 3x1' 3x1'                        Ther-Act                                                               Modalities                      CP -

## 2023-10-30 ENCOUNTER — OFFICE VISIT (OUTPATIENT)
Dept: PHYSICAL THERAPY | Facility: CLINIC | Age: 73
End: 2023-10-30
Payer: MEDICARE

## 2023-10-30 ENCOUNTER — APPOINTMENT (OUTPATIENT)
Dept: LAB | Facility: CLINIC | Age: 73
End: 2023-10-30
Payer: MEDICARE

## 2023-10-30 DIAGNOSIS — S82.142D CLOSED FRACTURE OF LEFT TIBIAL PLATEAU WITH ROUTINE HEALING, SUBSEQUENT ENCOUNTER: Primary | ICD-10-CM

## 2023-10-30 DIAGNOSIS — Z79.01 ENCOUNTER FOR CURRENT LONG-TERM USE OF ANTICOAGULANTS: ICD-10-CM

## 2023-10-30 LAB
INR PPP: 1.91 (ref 0.84–1.19)
PROTHROMBIN TIME: 21.6 SECONDS (ref 11.6–14.5)

## 2023-10-30 PROCEDURE — 97110 THERAPEUTIC EXERCISES: CPT | Performed by: PHYSICAL THERAPIST

## 2023-10-30 PROCEDURE — 97112 NEUROMUSCULAR REEDUCATION: CPT | Performed by: PHYSICAL THERAPIST

## 2023-10-30 PROCEDURE — 36415 COLL VENOUS BLD VENIPUNCTURE: CPT

## 2023-10-30 PROCEDURE — 85610 PROTHROMBIN TIME: CPT

## 2023-11-06 ENCOUNTER — APPOINTMENT (OUTPATIENT)
Dept: PHYSICAL THERAPY | Facility: CLINIC | Age: 73
End: 2023-11-06
Payer: MEDICARE

## 2023-11-13 ENCOUNTER — OFFICE VISIT (OUTPATIENT)
Dept: PHYSICAL THERAPY | Facility: CLINIC | Age: 73
End: 2023-11-13
Payer: MEDICARE

## 2023-11-13 DIAGNOSIS — S82.142D CLOSED FRACTURE OF LEFT TIBIAL PLATEAU WITH ROUTINE HEALING, SUBSEQUENT ENCOUNTER: Primary | ICD-10-CM

## 2023-11-13 PROCEDURE — 97112 NEUROMUSCULAR REEDUCATION: CPT | Performed by: PHYSICAL THERAPIST

## 2023-11-13 PROCEDURE — 97110 THERAPEUTIC EXERCISES: CPT | Performed by: PHYSICAL THERAPIST

## 2023-11-13 NOTE — PROGRESS NOTES
Daily Note     Today's date: 2023  Patient name: Deena Torres  : 1950  MRN: [id number]  Referring provider: Елена Steward  Dx:   Encounter Diagnosis     ICD-10-CM    1. Closed fracture of left tibial plateau with routine healing, subsequent encounter  S82.142D                      Subjective: Pt notes he did have 2 proceedures - port removal and incisional hernia repair - since last tx and is doing well today. He denies any pain sx today in L knee and notes no pain in incisional region of incisional hernia repair. He reports weight lift restriction of no more than 10# until follow up visit. Objective: See treatment diary below. Assessment: Pt continued with care today with some modifications secondary to recent procedure. He denied any pain sx with tx today. He progressed with NBOS EC balance 3 x 10" with minimal sway and did demonstrate improvement with repetition. Pt did modify squats to complete mini squat range of motion with Tno UE A and may benefit from progression to complete on Air Ex next visit to further challenge balance. Pt declined step up ex secondary to having some discomfort last visit and recent procedure. He noted no pain in L knee or incisional area post tx and tolerated care well today. Plan: Continue per plan of care.       Precautions: CHF, NWB LLE, incisional hernia repair  -10# weight restriction until further notice      Daily Treatment Diary:      Initial Evaluation Date: ,  re, 10/16 re  Compliance 10/16  10/30  11/13                 Visit Number 12 15  14                 Re-Eval  RE -  -              MC   Foto Captured Y -  -                        10/16  10/30  11/13                 Manual                      Retrograde massage -                     prom -                     Patellar mobz -                     Ther-Ex                      Bike L2 10'  L2 10'  L1  10'                 UBE  5'/5' L1  5'/5' L1  nc                 Squats 3x10  3x10  2x10 mini                 Standing knee bends 10x ea 2x10 ea 2 x 20 ea                 Standing marches 20x  20x  20x                 Standing abductions 20x ea  20x ea  20x                 Heel rise/tke 30x  30x  HR on foam  30x HR on foam                 Step ups 30x 8"  5x 8"  declined today                 Step downs  20x 6" -                   TKE w/TBand  Blue 2x10 Reddy  blue  w/BTB  2x10                 Neuro Re-Ed                      Step ups -                     Narrow dany c task Toe touch on step 30x Toe touch on 6" step 30x Toe touch on 6" step 30x          Narrow dany c ec -  10" x 3          Stance on ae 3x1' 3x1' 3 x 1' CS                       Ther-Act                                                               Modalities                      CP -

## 2023-11-20 ENCOUNTER — OFFICE VISIT (OUTPATIENT)
Dept: PHYSICAL THERAPY | Facility: CLINIC | Age: 73
End: 2023-11-20
Payer: MEDICARE

## 2023-11-20 DIAGNOSIS — S82.142D CLOSED FRACTURE OF LEFT TIBIAL PLATEAU WITH ROUTINE HEALING, SUBSEQUENT ENCOUNTER: Primary | ICD-10-CM

## 2023-11-20 PROCEDURE — 97112 NEUROMUSCULAR REEDUCATION: CPT | Performed by: PHYSICAL THERAPIST

## 2023-11-20 PROCEDURE — 97110 THERAPEUTIC EXERCISES: CPT | Performed by: PHYSICAL THERAPIST

## 2023-11-20 NOTE — PROGRESS NOTES
Daily Note     Today's date: 2023  Patient name: Corrinne Shiver  : 1950  MRN: [id number]  Referring provider: Chelsea Brunner  Dx:   Encounter Diagnosis     ICD-10-CM    1. Closed fracture of left tibial plateau with routine healing, subsequent encounter  S82.142D                      Subjective: Al notes knee is not problematic. Continues to feel off balance, particularly when relying on LLE. Objective: See treatment diary below      Assessment: Tolerated treatment well. Patient demonstrated fatigue post treatment, exhibited good technique with therapeutic exercises, and would benefit from continued PT      Plan: Continue per plan of care. Progress treatment as tolerated.        Precautions: CHF, NWB LLE, incisional hernia repair  -10# weight restriction until further notice      Daily Treatment Diary:      Initial Evaluation Date: ,  re, 10/16 re  Compliance 10/16  10/30  11/13  11/20               Visit Number 15 13  15  15               Re-Eval  RE -  -  -            [address] [place] [address] Blvd Captured Y -  -  -                      10/16  10/30  11/13  11/20               Manual                      Retrograde massage -                     prom -                     Patellar mobz -                     Ther-Ex                      Bike L2 10'  L2 10'  L1  10'  L2 15'               UBE  5'/5' L1  5'/5' L1  nc  5'/5' L1               Squats 3x10  3x10  2x10 mini  3x10 mini on ae               Standing knee bends 10x ea 2x10 ea 2 x 20 ea  2x20 on ae               Standing marches 20x  20x  20x  30x on ae               Standing abductions 20x ea  20x ea  20x  20x on ae               Heel rise/tke 30x  30x  HR on foam  30x HR on foam  30x on ae               Step ups 30x 8"  5x 8"  declined today  held               Step downs  20x 6" -                   TKE w/TBand  Blue 2x10 Reddy  blue  w/BTB  2x10  vlue 3x10                Neuro Re-Ed                      Step ups -                     Narrow dany c task Toe touch on step 30x Toe touch on 6" step 30x Toe touch on 6" step 30x Toe touch on 6" step         Narrow dany c ec -  10" x 3 10"x5         Stance on ae 3x1' 3x1' 3 x 1' CS 3x1'         Biodex - - - 2x3' firm medium random         Ther-Act                                                               Modalities                      CP -

## 2023-11-27 ENCOUNTER — EVALUATION (OUTPATIENT)
Dept: PHYSICAL THERAPY | Facility: CLINIC | Age: 73
End: 2023-11-27
Payer: MEDICARE

## 2023-11-27 DIAGNOSIS — S82.142D CLOSED FRACTURE OF LEFT TIBIAL PLATEAU WITH ROUTINE HEALING, SUBSEQUENT ENCOUNTER: Primary | ICD-10-CM

## 2023-11-27 PROCEDURE — 97110 THERAPEUTIC EXERCISES: CPT | Performed by: PHYSICAL THERAPIST

## 2023-11-27 PROCEDURE — 97112 NEUROMUSCULAR REEDUCATION: CPT | Performed by: PHYSICAL THERAPIST

## 2023-11-27 NOTE — PROGRESS NOTES
PT Re-Evaluation     Today's date: 2023  Patient name: Zak Cook  : 1950  MRN: [id number]  Referring provider: Pratibha Glasgow MD  Dx:   Encounter Diagnosis     ICD-10-CM    1. Closed fracture of left tibial plateau with routine healing, subsequent encounter  S82.142D                      Assessment  Assessment details: Zak Cook has continued with PT 1-2x/week, progressing as tolerated for treatment of Closed fracture of left tibial plateau with routine healing, subsequent encounter  (primary encounter diagnosis) 2023  Pt. Has attended 4 sessions in past month 2* vacation, but continues to show improvement. Primary deficits remain balance and fear avoidance behavior. Pt. Has returned to recreational walking which is going well and not related to any pain increase. At this point, recommend patient perform ongoing strengthening and balance with anticipated DC 4-6 weeks. Symptom irritability: lowUnderstanding of Dx/Px/POC: good   Prognosis: good    Goals  ST weeks  -pt. Will demonstrate L knee prom flexion to 110* - met  -Pt. Will demonstrate L knee VMO contraction wnl and demonstrate SLR s lag -met    LT weeks  -Pt. Will demonstate gait pattern wnl - met  -Pt.  Will demonstate L knee strength grossly 4+/5 - met    Plan  Patient would benefit from: skilled physical therapy  Planned modality interventions: cryotherapy, high voltage pulsed current: pain management, high voltage pulsed current: spasm management and unattended electrical stimulation  Planned therapy interventions: abdominal trunk stabilization, ADL training, balance, balance/weight bearing training, body mechanics training, flexibility, functional ROM exercises, gait training, graded exercise, graded motor, home exercise program, therapeutic training, therapeutic exercise, therapeutic activities, stretching, strengthening, patient education, postural training, neuromuscular re-education, motor coordination training, [place] taping, manual therapy, kinesiology taping, joint mobilization and ADL retraining  Frequency: 2x week  Duration in weeks: 6  Plan of Care beginning date: 2023  Plan of Care expiration date: 2024  Treatment plan discussed with: patient and family        Subjective Evaluation    History of Present Illness  Mechanism of injury: Al continues to slowly feel more and more at pre injury functional level. Remains apprehensive with walking on compliant surfaces. Patient Goals  Patient goals for therapy: decreased pain, improved balance, increased motion, return to sport/leisure activities, independence with ADLs/IADLs, increased strength and decreased edema    Pain  Current pain ratin  At best pain ratin  At worst pain ratin  Location: l knee  Quality: tight and pressure  Relieving factors: ice and medications    Social Support  Steps to enter house: no  Stairs in house: no   Lives with: spouse    Employment status: not working    Diagnostic Tests  X-ray: abnormal  Treatments  Previous treatment: immobilization and medication  Current treatment: immobilization and medication        Objective     Observations     Additional Observation Details  hemosiderin staining BLE    Noted L quad and calf atrophy remains but is improving        Palpation     Additional Palpation Details  No ttp reported    Tenderness   Left Knee   Tenderness in the MCL (proximal). No tenderness in the LCL (distal), LCL (proximal), MCL (distal), medial joint line, medial patella and patellar tendon.      Active Range of Motion   Left Knee   Flexion: WFL  Extension: 0 degrees WFL  Extensor la degrees WFL    Right Knee   Normal active range of motion    Passive Range of Motion   Left Knee   Flexion: WFL  Extension: [facility]    Strength/Myotome Testing     Additional Strength Details  L Knee:  Flexion: 53#  Extension: 53#    R Knee:  Flexion: 56#  Extension: 58#    Tests     Additional Tests Details  TU"  5x sit to stand: 15"  KITTY: see media  Bilateral comparison (20% impairment on L)    Swelling     Left Knee Girth Measurement (cm)   Joint line: 41 cm    Right Knee Girth Measurement (cm)   Joint line: 40 cm    General Comments:      Knee Comments  Bilateral comparison on biodex.  Pt. Limited 27% on firm surface compared to R               Precautions: CHF, NWB LLE, incisional hernia repair 11/6 -10# weight restriction until further notice      Daily Treatment Diary:      Initial Evaluation Date: 7/31, 9/5 re, 10/16 re  Compliance 10/16  10/30  11/13  11/20  11/27             Visit Number [id number]  14  15  16             Re-Eval  RE -  -  -  -          [facility]   Foto Captured Y -  -  -  -                    10/16  10/30  11/13  11/20  11/27             Manual                      Retrograde massage -                     prom -                     Patellar mobz -                     Ther-Ex                      Bike L2 10'  L2 10'  L1  10'  L2 15'  L2 15'             UBE  5'/5' L1  5'/5' L1  nc  5'/5' L1  5'/5' L2             Squats 3x10  3x10  2x10 mini  3x10 mini on ae  30x on ae             Standing knee bends 10x ea 2x10 ea 2 x 20 ea  2x20 on ae  2x20 on ae             Standing marches 20x  20x  20x  30x on ae  30x on ae             Standing abductions 20x ea  20x ea  20x  20x on ae  30x on ae             Heel rise/tke 30x  30x  HR on foam  30x HR on foam  30x on ae  30x on ae             Step ups 30x 8"  5x 8"  declined today  held  10x             Step downs  20x 6" -                   TKE w/TBand  Blue 2x10 Reddy  blue  w/BTB  2x10  vlue 3x10   blue 30x             Neuro Re-Ed                      Step ups -                     Narrow dany c task Toe touch on step 30x Toe touch on 6" step 30x Toe touch on 6" step 30x Toe touch on 6" step Toe touch 6" on step        Narrow dany c ec -  10" x 3 10"x5 10x5"        Stance on ae 3x1' 3x1' 3 x 1' CS 3x1' 3x1'        Biodex - - - 2x3' firm medium random 2x3' random medium         Ther-Act Modalities                      CP -

## 2023-12-11 ENCOUNTER — APPOINTMENT (OUTPATIENT)
Dept: LAB | Facility: CLINIC | Age: 73
End: 2023-12-11
Payer: MEDICARE

## 2023-12-11 DIAGNOSIS — Z79.01 ENCOUNTER FOR CURRENT LONG-TERM USE OF ANTICOAGULANTS: ICD-10-CM

## 2023-12-11 LAB
INR PPP: 1.67 (ref 0.84–1.19)
PROTHROMBIN TIME: 19.4 SECONDS (ref 11.6–14.5)

## 2023-12-11 PROCEDURE — 36415 COLL VENOUS BLD VENIPUNCTURE: CPT

## 2023-12-11 PROCEDURE — 85610 PROTHROMBIN TIME: CPT

## 2023-12-26 ENCOUNTER — APPOINTMENT (OUTPATIENT)
Dept: LAB | Facility: CLINIC | Age: 73
End: 2023-12-26
Payer: MEDICARE

## 2023-12-26 DIAGNOSIS — Z79.01 ENCOUNTER FOR CURRENT LONG-TERM USE OF ANTICOAGULANTS: ICD-10-CM

## 2023-12-26 LAB
INR PPP: 2.37 (ref 0.84–1.19)
PROTHROMBIN TIME: 25.5 SECONDS (ref 11.6–14.5)

## 2023-12-26 PROCEDURE — 36415 COLL VENOUS BLD VENIPUNCTURE: CPT

## 2023-12-26 PROCEDURE — 85610 PROTHROMBIN TIME: CPT

## 2024-01-26 ENCOUNTER — APPOINTMENT (OUTPATIENT)
Dept: LAB | Facility: CLINIC | Age: 74
End: 2024-01-26
Payer: MEDICARE

## 2024-01-26 DIAGNOSIS — C34.90 MALIGNANT NEOPLASM OF BRONCHUS AND LUNG (HCC): ICD-10-CM

## 2024-01-26 DIAGNOSIS — Z79.01 LONG TERM (CURRENT) USE OF ANTICOAGULANTS: Primary | ICD-10-CM

## 2024-01-26 LAB
25(OH)D3 SERPL-MCNC: 27.9 NG/ML (ref 30–100)
ALBUMIN SERPL BCP-MCNC: 2.5 G/DL (ref 3.5–5)
ALP SERPL-CCNC: 82 U/L (ref 34–104)
ALT SERPL W P-5'-P-CCNC: 26 U/L (ref 7–52)
ANION GAP SERPL CALCULATED.3IONS-SCNC: 4 MMOL/L
AST SERPL W P-5'-P-CCNC: 23 U/L (ref 13–39)
BASOPHILS # BLD AUTO: 0.04 THOUSANDS/ÂΜL (ref 0–0.1)
BASOPHILS NFR BLD AUTO: 1 % (ref 0–1)
BILIRUB SERPL-MCNC: 0.34 MG/DL (ref 0.2–1)
BUN SERPL-MCNC: 24 MG/DL (ref 5–25)
CALCIUM ALBUM COR SERPL-MCNC: 9.4 MG/DL (ref 8.3–10.1)
CALCIUM SERPL-MCNC: 8.2 MG/DL (ref 8.4–10.2)
CHLORIDE SERPL-SCNC: 106 MMOL/L (ref 96–108)
CO2 SERPL-SCNC: 29 MMOL/L (ref 21–32)
CREAT SERPL-MCNC: 1.36 MG/DL (ref 0.6–1.3)
EOSINOPHIL # BLD AUTO: 0.22 THOUSAND/ÂΜL (ref 0–0.61)
EOSINOPHIL NFR BLD AUTO: 4 % (ref 0–6)
ERYTHROCYTE [DISTWIDTH] IN BLOOD BY AUTOMATED COUNT: 14.7 % (ref 11.6–15.1)
GFR SERPL CREATININE-BSD FRML MDRD: 51 ML/MIN/1.73SQ M
GLUCOSE SERPL-MCNC: 155 MG/DL (ref 65–140)
HCT VFR BLD AUTO: 39.7 % (ref 36.5–49.3)
HGB BLD-MCNC: 12.7 G/DL (ref 12–17)
IMM GRANULOCYTES # BLD AUTO: 0.01 THOUSAND/UL (ref 0–0.2)
IMM GRANULOCYTES NFR BLD AUTO: 0 % (ref 0–2)
INR PPP: 3.3 (ref 0.84–1.19)
LYMPHOCYTES # BLD AUTO: 0.65 THOUSANDS/ÂΜL (ref 0.6–4.47)
LYMPHOCYTES NFR BLD AUTO: 12 % (ref 14–44)
MAGNESIUM SERPL-MCNC: 2.1 MG/DL (ref 1.9–2.7)
MCH RBC QN AUTO: 29.9 PG (ref 26.8–34.3)
MCHC RBC AUTO-ENTMCNC: 32 G/DL (ref 31.4–37.4)
MCV RBC AUTO: 93 FL (ref 82–98)
MONOCYTES # BLD AUTO: 0.35 THOUSAND/ÂΜL (ref 0.17–1.22)
MONOCYTES NFR BLD AUTO: 7 % (ref 4–12)
NEUTROPHILS # BLD AUTO: 4.02 THOUSANDS/ÂΜL (ref 1.85–7.62)
NEUTS SEG NFR BLD AUTO: 76 % (ref 43–75)
NRBC BLD AUTO-RTO: 0 /100 WBCS
PLATELET # BLD AUTO: 235 THOUSANDS/UL (ref 149–390)
PMV BLD AUTO: 10.9 FL (ref 8.9–12.7)
POTASSIUM SERPL-SCNC: 4.3 MMOL/L (ref 3.5–5.3)
PROT SERPL-MCNC: 4.8 G/DL (ref 6.4–8.4)
PROTHROMBIN TIME: 32.8 SECONDS (ref 11.6–14.5)
RBC # BLD AUTO: 4.25 MILLION/UL (ref 3.88–5.62)
SODIUM SERPL-SCNC: 139 MMOL/L (ref 135–147)
WBC # BLD AUTO: 5.29 THOUSAND/UL (ref 4.31–10.16)

## 2024-01-26 PROCEDURE — 85610 PROTHROMBIN TIME: CPT

## 2024-01-26 PROCEDURE — 85025 COMPLETE CBC W/AUTO DIFF WBC: CPT

## 2024-01-26 PROCEDURE — 83735 ASSAY OF MAGNESIUM: CPT

## 2024-01-26 PROCEDURE — 80053 COMPREHEN METABOLIC PANEL: CPT

## 2024-01-26 PROCEDURE — 82306 VITAMIN D 25 HYDROXY: CPT

## 2024-01-26 PROCEDURE — 36415 COLL VENOUS BLD VENIPUNCTURE: CPT

## 2024-02-26 ENCOUNTER — APPOINTMENT (OUTPATIENT)
Dept: LAB | Facility: CLINIC | Age: 74
End: 2024-02-26
Payer: MEDICARE

## 2024-02-26 DIAGNOSIS — Z79.01 LONG TERM (CURRENT) USE OF ANTICOAGULANTS: ICD-10-CM

## 2024-02-26 LAB
INR PPP: 2.92 (ref 0.84–1.19)
PROTHROMBIN TIME: 29.8 SECONDS (ref 11.6–14.5)

## 2024-02-26 PROCEDURE — 36415 COLL VENOUS BLD VENIPUNCTURE: CPT

## 2024-02-26 PROCEDURE — 85610 PROTHROMBIN TIME: CPT

## 2024-03-29 ENCOUNTER — APPOINTMENT (OUTPATIENT)
Dept: LAB | Facility: CLINIC | Age: 74
End: 2024-03-29
Payer: MEDICARE

## 2024-03-29 DIAGNOSIS — Z79.01 LONG TERM (CURRENT) USE OF ANTICOAGULANTS: ICD-10-CM

## 2024-03-29 LAB
INR PPP: 3.45 (ref 0.84–1.19)
PROTHROMBIN TIME: 33.9 SECONDS (ref 11.6–14.5)

## 2024-03-29 PROCEDURE — 36415 COLL VENOUS BLD VENIPUNCTURE: CPT

## 2024-03-29 PROCEDURE — 85610 PROTHROMBIN TIME: CPT

## 2024-05-03 ENCOUNTER — TRANSCRIBE ORDERS (OUTPATIENT)
Dept: LAB | Facility: CLINIC | Age: 74
End: 2024-05-03

## 2024-05-03 ENCOUNTER — APPOINTMENT (OUTPATIENT)
Dept: LAB | Facility: CLINIC | Age: 74
End: 2024-05-03
Payer: MEDICARE

## 2024-05-03 DIAGNOSIS — Z79.01 LONG TERM (CURRENT) USE OF ANTICOAGULANTS: Primary | ICD-10-CM

## 2024-05-03 DIAGNOSIS — Z79.01 LONG TERM (CURRENT) USE OF ANTICOAGULANTS: ICD-10-CM

## 2024-05-03 LAB
INR PPP: 1.85 (ref 0.84–1.19)
PROTHROMBIN TIME: 21 SECONDS (ref 11.6–14.5)

## 2024-05-03 PROCEDURE — 36415 COLL VENOUS BLD VENIPUNCTURE: CPT

## 2024-05-03 PROCEDURE — 85610 PROTHROMBIN TIME: CPT

## 2024-05-31 ENCOUNTER — APPOINTMENT (OUTPATIENT)
Dept: LAB | Facility: CLINIC | Age: 74
End: 2024-05-31
Payer: MEDICARE

## 2024-05-31 DIAGNOSIS — Z79.01 LONG TERM (CURRENT) USE OF ANTICOAGULANTS: ICD-10-CM

## 2024-05-31 LAB
INR PPP: 2.14 (ref 0.84–1.19)
PROTHROMBIN TIME: 23.5 SECONDS (ref 11.6–14.5)

## 2024-05-31 PROCEDURE — 85610 PROTHROMBIN TIME: CPT

## 2024-05-31 PROCEDURE — 36415 COLL VENOUS BLD VENIPUNCTURE: CPT

## 2024-06-28 ENCOUNTER — APPOINTMENT (OUTPATIENT)
Dept: LAB | Facility: CLINIC | Age: 74
End: 2024-06-28
Payer: MEDICARE

## 2024-06-28 DIAGNOSIS — D50.9 IRON DEFICIENCY ANEMIA, UNSPECIFIED IRON DEFICIENCY ANEMIA TYPE: ICD-10-CM

## 2024-06-28 DIAGNOSIS — Z79.01 LONG TERM (CURRENT) USE OF ANTICOAGULANTS: ICD-10-CM

## 2024-06-28 DIAGNOSIS — E87.6 HYPOKALEMIA: ICD-10-CM

## 2024-06-28 LAB
ANION GAP SERPL CALCULATED.3IONS-SCNC: 4 MMOL/L (ref 4–13)
BASOPHILS # BLD AUTO: 0.04 THOUSANDS/ÂΜL (ref 0–0.1)
BASOPHILS NFR BLD AUTO: 1 % (ref 0–1)
BUN SERPL-MCNC: 28 MG/DL (ref 5–25)
CALCIUM SERPL-MCNC: 8.2 MG/DL (ref 8.4–10.2)
CHLORIDE SERPL-SCNC: 108 MMOL/L (ref 96–108)
CO2 SERPL-SCNC: 32 MMOL/L (ref 21–32)
CREAT SERPL-MCNC: 1.33 MG/DL (ref 0.6–1.3)
EOSINOPHIL # BLD AUTO: 0.24 THOUSAND/ÂΜL (ref 0–0.61)
EOSINOPHIL NFR BLD AUTO: 4 % (ref 0–6)
ERYTHROCYTE [DISTWIDTH] IN BLOOD BY AUTOMATED COUNT: 15.7 % (ref 11.6–15.1)
GFR SERPL CREATININE-BSD FRML MDRD: 53 ML/MIN/1.73SQ M
GLUCOSE SERPL-MCNC: 113 MG/DL (ref 65–140)
HCT VFR BLD AUTO: 39 % (ref 36.5–49.3)
HGB BLD-MCNC: 12.5 G/DL (ref 12–17)
IMM GRANULOCYTES # BLD AUTO: 0.02 THOUSAND/UL (ref 0–0.2)
IMM GRANULOCYTES NFR BLD AUTO: 0 % (ref 0–2)
INR PPP: 2.63 (ref 0.84–1.19)
LYMPHOCYTES # BLD AUTO: 0.87 THOUSANDS/ÂΜL (ref 0.6–4.47)
LYMPHOCYTES NFR BLD AUTO: 15 % (ref 14–44)
MCH RBC QN AUTO: 29.2 PG (ref 26.8–34.3)
MCHC RBC AUTO-ENTMCNC: 32.1 G/DL (ref 31.4–37.4)
MCV RBC AUTO: 91 FL (ref 82–98)
MONOCYTES # BLD AUTO: 0.51 THOUSAND/ÂΜL (ref 0.17–1.22)
MONOCYTES NFR BLD AUTO: 9 % (ref 4–12)
NEUTROPHILS # BLD AUTO: 4.12 THOUSANDS/ÂΜL (ref 1.85–7.62)
NEUTS SEG NFR BLD AUTO: 71 % (ref 43–75)
NRBC BLD AUTO-RTO: 0 /100 WBCS
PLATELET # BLD AUTO: 225 THOUSANDS/UL (ref 149–390)
PMV BLD AUTO: 11.2 FL (ref 8.9–12.7)
POTASSIUM SERPL-SCNC: 5.1 MMOL/L (ref 3.5–5.3)
PROTHROMBIN TIME: 27.5 SECONDS (ref 11.6–14.5)
RBC # BLD AUTO: 4.28 MILLION/UL (ref 3.88–5.62)
SODIUM SERPL-SCNC: 144 MMOL/L (ref 135–147)
WBC # BLD AUTO: 5.8 THOUSAND/UL (ref 4.31–10.16)

## 2024-06-28 PROCEDURE — 85610 PROTHROMBIN TIME: CPT

## 2024-06-28 PROCEDURE — 80048 BASIC METABOLIC PNL TOTAL CA: CPT

## 2024-06-28 PROCEDURE — 36415 COLL VENOUS BLD VENIPUNCTURE: CPT

## 2024-06-28 PROCEDURE — 85025 COMPLETE CBC W/AUTO DIFF WBC: CPT

## 2024-08-05 ENCOUNTER — APPOINTMENT (OUTPATIENT)
Dept: LAB | Facility: CLINIC | Age: 74
End: 2024-08-05
Payer: MEDICARE

## 2024-08-05 DIAGNOSIS — Z79.01 LONG TERM (CURRENT) USE OF ANTICOAGULANTS: ICD-10-CM

## 2024-08-05 LAB
INR PPP: 2.02 (ref 0.85–1.19)
PROTHROMBIN TIME: 22.9 SECONDS (ref 12.3–15)

## 2024-08-05 PROCEDURE — 36415 COLL VENOUS BLD VENIPUNCTURE: CPT

## 2024-08-05 PROCEDURE — 85610 PROTHROMBIN TIME: CPT

## 2024-11-07 ENCOUNTER — APPOINTMENT (OUTPATIENT)
Dept: LAB | Facility: CLINIC | Age: 74
End: 2024-11-07
Payer: MEDICARE

## 2024-11-07 DIAGNOSIS — Z79.01 LONG TERM (CURRENT) USE OF ANTICOAGULANTS: ICD-10-CM

## 2024-11-07 LAB
INR PPP: 2.2 (ref 0.85–1.19)
PROTHROMBIN TIME: 24.4 SECONDS (ref 12.3–15)

## 2024-11-07 PROCEDURE — 85610 PROTHROMBIN TIME: CPT

## 2024-11-07 PROCEDURE — 36415 COLL VENOUS BLD VENIPUNCTURE: CPT

## 2024-12-27 ENCOUNTER — APPOINTMENT (OUTPATIENT)
Dept: LAB | Facility: CLINIC | Age: 74
End: 2024-12-27
Payer: COMMERCIAL

## 2024-12-27 DIAGNOSIS — E87.6 HYPOPOTASSEMIA: ICD-10-CM

## 2024-12-27 DIAGNOSIS — Z79.01 LONG TERM (CURRENT) USE OF ANTICOAGULANTS: ICD-10-CM

## 2024-12-27 LAB
25(OH)D3 SERPL-MCNC: 30.6 NG/ML (ref 30–100)
ALBUMIN SERPL BCG-MCNC: 2.4 G/DL (ref 3.5–5)
ALP SERPL-CCNC: 91 U/L (ref 34–104)
ALT SERPL W P-5'-P-CCNC: 31 U/L (ref 7–52)
ANION GAP SERPL CALCULATED.3IONS-SCNC: 6 MMOL/L (ref 4–13)
AST SERPL W P-5'-P-CCNC: 29 U/L (ref 13–39)
BACTERIA UR QL AUTO: ABNORMAL /HPF
BASOPHILS # BLD AUTO: 0.03 THOUSANDS/ÂΜL (ref 0–0.1)
BASOPHILS NFR BLD AUTO: 1 % (ref 0–1)
BILIRUB SERPL-MCNC: 0.44 MG/DL (ref 0.2–1)
BILIRUB UR QL STRIP: NEGATIVE
BUN SERPL-MCNC: 22 MG/DL (ref 5–25)
CALCIUM ALBUM COR SERPL-MCNC: 9.3 MG/DL (ref 8.3–10.1)
CALCIUM SERPL-MCNC: 8 MG/DL (ref 8.4–10.2)
CHLORIDE SERPL-SCNC: 106 MMOL/L (ref 96–108)
CHOLEST SERPL-MCNC: 107 MG/DL (ref ?–200)
CLARITY UR: CLEAR
CO2 SERPL-SCNC: 30 MMOL/L (ref 21–32)
COLOR UR: YELLOW
CREAT SERPL-MCNC: 1.13 MG/DL (ref 0.6–1.3)
EOSINOPHIL # BLD AUTO: 0.13 THOUSAND/ÂΜL (ref 0–0.61)
EOSINOPHIL NFR BLD AUTO: 3 % (ref 0–6)
ERYTHROCYTE [DISTWIDTH] IN BLOOD BY AUTOMATED COUNT: 15.8 % (ref 11.6–15.1)
GFR SERPL CREATININE-BSD FRML MDRD: 64 ML/MIN/1.73SQ M
GLUCOSE P FAST SERPL-MCNC: 95 MG/DL (ref 65–99)
GLUCOSE UR STRIP-MCNC: NEGATIVE MG/DL
HCT VFR BLD AUTO: 39.7 % (ref 36.5–49.3)
HDLC SERPL-MCNC: 32 MG/DL
HGB BLD-MCNC: 12.2 G/DL (ref 12–17)
HGB UR QL STRIP.AUTO: NEGATIVE
HYALINE CASTS #/AREA URNS LPF: ABNORMAL /LPF
IMM GRANULOCYTES # BLD AUTO: 0.01 THOUSAND/UL (ref 0–0.2)
IMM GRANULOCYTES NFR BLD AUTO: 0 % (ref 0–2)
INR PPP: 2.26 (ref 0.85–1.19)
KETONES UR STRIP-MCNC: NEGATIVE MG/DL
LDLC SERPL CALC-MCNC: 56 MG/DL (ref 0–100)
LEUKOCYTE ESTERASE UR QL STRIP: ABNORMAL
LYMPHOCYTES # BLD AUTO: 0.74 THOUSANDS/ÂΜL (ref 0.6–4.47)
LYMPHOCYTES NFR BLD AUTO: 19 % (ref 14–44)
MCH RBC QN AUTO: 29.1 PG (ref 26.8–34.3)
MCHC RBC AUTO-ENTMCNC: 30.7 G/DL (ref 31.4–37.4)
MCV RBC AUTO: 95 FL (ref 82–98)
MONOCYTES # BLD AUTO: 0.5 THOUSAND/ÂΜL (ref 0.17–1.22)
MONOCYTES NFR BLD AUTO: 13 % (ref 4–12)
MUCOUS THREADS UR QL AUTO: ABNORMAL
NEUTROPHILS # BLD AUTO: 2.48 THOUSANDS/ÂΜL (ref 1.85–7.62)
NEUTS SEG NFR BLD AUTO: 64 % (ref 43–75)
NITRITE UR QL STRIP: NEGATIVE
NON-SQ EPI CELLS URNS QL MICRO: ABNORMAL /HPF
NONHDLC SERPL-MCNC: 75 MG/DL
NRBC BLD AUTO-RTO: 0 /100 WBCS
PH UR STRIP.AUTO: 5.5 [PH]
PLATELET # BLD AUTO: 231 THOUSANDS/UL (ref 149–390)
PMV BLD AUTO: 10.8 FL (ref 8.9–12.7)
POTASSIUM SERPL-SCNC: 4.4 MMOL/L (ref 3.5–5.3)
PROT SERPL-MCNC: 4.8 G/DL (ref 6.4–8.4)
PROT UR STRIP-MCNC: ABNORMAL MG/DL
PROTHROMBIN TIME: 24.9 SECONDS (ref 12.3–15)
PSA SERPL-MCNC: 1.36 NG/ML (ref 0–4)
RBC # BLD AUTO: 4.19 MILLION/UL (ref 3.88–5.62)
RBC #/AREA URNS AUTO: ABNORMAL /HPF
SODIUM SERPL-SCNC: 142 MMOL/L (ref 135–147)
SP GR UR STRIP.AUTO: 1.01 (ref 1–1.03)
TRIGL SERPL-MCNC: 95 MG/DL (ref ?–150)
TSH SERPL DL<=0.05 MIU/L-ACNC: 4.78 UIU/ML (ref 0.45–4.5)
UROBILINOGEN UR STRIP-ACNC: <2 MG/DL
WBC # BLD AUTO: 3.89 THOUSAND/UL (ref 4.31–10.16)
WBC #/AREA URNS AUTO: ABNORMAL /HPF

## 2024-12-27 PROCEDURE — 84443 ASSAY THYROID STIM HORMONE: CPT

## 2024-12-27 PROCEDURE — 82306 VITAMIN D 25 HYDROXY: CPT

## 2024-12-27 PROCEDURE — 36415 COLL VENOUS BLD VENIPUNCTURE: CPT

## 2024-12-27 PROCEDURE — 85610 PROTHROMBIN TIME: CPT

## 2024-12-27 PROCEDURE — 80053 COMPREHEN METABOLIC PANEL: CPT

## 2024-12-27 PROCEDURE — 85025 COMPLETE CBC W/AUTO DIFF WBC: CPT

## 2024-12-27 PROCEDURE — 80061 LIPID PANEL: CPT

## 2024-12-27 PROCEDURE — 84153 ASSAY OF PSA TOTAL: CPT

## 2024-12-27 PROCEDURE — 81001 URINALYSIS AUTO W/SCOPE: CPT

## 2025-03-07 ENCOUNTER — APPOINTMENT (OUTPATIENT)
Dept: LAB | Facility: CLINIC | Age: 75
End: 2025-03-07
Payer: COMMERCIAL

## 2025-03-07 DIAGNOSIS — Z79.01 LONG TERM (CURRENT) USE OF ANTICOAGULANTS: ICD-10-CM

## 2025-03-07 LAB
INR PPP: 3.35 (ref 0.85–1.19)
PROTHROMBIN TIME: 33.5 SECONDS (ref 12.3–15)

## 2025-03-07 PROCEDURE — 85610 PROTHROMBIN TIME: CPT

## 2025-03-07 PROCEDURE — 36415 COLL VENOUS BLD VENIPUNCTURE: CPT

## 2025-06-10 ENCOUNTER — TRANSCRIBE ORDERS (OUTPATIENT)
Dept: LAB | Facility: CLINIC | Age: 75
End: 2025-06-10

## 2025-06-10 ENCOUNTER — APPOINTMENT (OUTPATIENT)
Dept: LAB | Facility: CLINIC | Age: 75
End: 2025-06-10
Payer: MEDICARE

## 2025-06-10 DIAGNOSIS — R42 DIZZINESS: ICD-10-CM

## 2025-06-10 DIAGNOSIS — Z79.899 LONG TERM USE OF DRUG: ICD-10-CM

## 2025-06-10 DIAGNOSIS — C34.90 MALIGNANT NEOPLASM OF LUNG, UNSPECIFIED LATERALITY, UNSPECIFIED PART OF LUNG (HCC): ICD-10-CM

## 2025-06-10 DIAGNOSIS — I48.0 PAROXYSMAL ATRIAL FIBRILLATION (HCC): ICD-10-CM

## 2025-06-10 DIAGNOSIS — I50.32 CHRONIC DIASTOLIC (CONGESTIVE) HEART FAILURE (HCC): Primary | ICD-10-CM

## 2025-06-10 DIAGNOSIS — R79.0 LOW MAGNESIUM LEVEL: ICD-10-CM

## 2025-06-10 DIAGNOSIS — E87.6 HYPOKALEMIA: ICD-10-CM

## 2025-06-10 DIAGNOSIS — I10 HYPERTENSION, ESSENTIAL: ICD-10-CM

## 2025-06-10 DIAGNOSIS — Z79.01 LONG TERM CURRENT USE OF ANTICOAGULANT: ICD-10-CM

## 2025-06-10 LAB
ALBUMIN SERPL BCG-MCNC: 2.5 G/DL (ref 3.5–5)
ALP SERPL-CCNC: 107 U/L (ref 34–104)
ALT SERPL W P-5'-P-CCNC: 30 U/L (ref 7–52)
ANION GAP SERPL CALCULATED.3IONS-SCNC: 5 MMOL/L (ref 4–13)
AST SERPL W P-5'-P-CCNC: 26 U/L (ref 13–39)
BASOPHILS # BLD AUTO: 0.05 THOUSANDS/ÂΜL (ref 0–0.1)
BASOPHILS NFR BLD AUTO: 1 % (ref 0–1)
BILIRUB DIRECT SERPL-MCNC: 0.12 MG/DL (ref 0–0.2)
BILIRUB SERPL-MCNC: 0.33 MG/DL (ref 0.2–1)
BUN SERPL-MCNC: 20 MG/DL (ref 5–25)
CALCIUM SERPL-MCNC: 8.5 MG/DL (ref 8.4–10.2)
CHLORIDE SERPL-SCNC: 104 MMOL/L (ref 96–108)
CO2 SERPL-SCNC: 32 MMOL/L (ref 21–32)
CREAT SERPL-MCNC: 0.95 MG/DL (ref 0.6–1.3)
EOSINOPHIL # BLD AUTO: 0.17 THOUSAND/ÂΜL (ref 0–0.61)
EOSINOPHIL NFR BLD AUTO: 3 % (ref 0–6)
ERYTHROCYTE [DISTWIDTH] IN BLOOD BY AUTOMATED COUNT: 16.8 % (ref 11.6–15.1)
GFR SERPL CREATININE-BSD FRML MDRD: 79 ML/MIN/1.73SQ M
GLUCOSE P FAST SERPL-MCNC: 112 MG/DL (ref 65–99)
HCT VFR BLD AUTO: 39.9 % (ref 36.5–49.3)
HGB BLD-MCNC: 12 G/DL (ref 12–17)
IMM GRANULOCYTES # BLD AUTO: 0.02 THOUSAND/UL (ref 0–0.2)
IMM GRANULOCYTES NFR BLD AUTO: 0 % (ref 0–2)
INR PPP: 2.67 (ref 0.85–1.19)
LYMPHOCYTES # BLD AUTO: 0.66 THOUSANDS/ÂΜL (ref 0.6–4.47)
LYMPHOCYTES NFR BLD AUTO: 11 % (ref 14–44)
MAGNESIUM SERPL-MCNC: 2.2 MG/DL (ref 1.9–2.7)
MCH RBC QN AUTO: 27.1 PG (ref 26.8–34.3)
MCHC RBC AUTO-ENTMCNC: 30.1 G/DL (ref 31.4–37.4)
MCV RBC AUTO: 90 FL (ref 82–98)
MONOCYTES # BLD AUTO: 0.51 THOUSAND/ÂΜL (ref 0.17–1.22)
MONOCYTES NFR BLD AUTO: 8 % (ref 4–12)
NEUTROPHILS # BLD AUTO: 4.68 THOUSANDS/ÂΜL (ref 1.85–7.62)
NEUTS SEG NFR BLD AUTO: 77 % (ref 43–75)
NRBC BLD AUTO-RTO: 0 /100 WBCS
PLATELET # BLD AUTO: 318 THOUSANDS/UL (ref 149–390)
PMV BLD AUTO: 10.9 FL (ref 8.9–12.7)
POTASSIUM SERPL-SCNC: 4.6 MMOL/L (ref 3.5–5.3)
PROT SERPL-MCNC: 5.1 G/DL (ref 6.4–8.4)
PROTHROMBIN TIME: 28.3 SECONDS (ref 12.3–15)
RBC # BLD AUTO: 4.43 MILLION/UL (ref 3.88–5.62)
SODIUM SERPL-SCNC: 141 MMOL/L (ref 135–147)
WBC # BLD AUTO: 6.09 THOUSAND/UL (ref 4.31–10.16)

## 2025-06-10 PROCEDURE — 80076 HEPATIC FUNCTION PANEL: CPT

## 2025-06-10 PROCEDURE — 80048 BASIC METABOLIC PNL TOTAL CA: CPT

## 2025-06-10 PROCEDURE — 85610 PROTHROMBIN TIME: CPT

## 2025-06-10 PROCEDURE — 85025 COMPLETE CBC W/AUTO DIFF WBC: CPT

## 2025-06-10 PROCEDURE — 36415 COLL VENOUS BLD VENIPUNCTURE: CPT

## 2025-06-10 PROCEDURE — 83735 ASSAY OF MAGNESIUM: CPT

## 2025-07-02 ENCOUNTER — APPOINTMENT (OUTPATIENT)
Dept: LAB | Facility: CLINIC | Age: 75
End: 2025-07-02
Payer: MEDICARE

## 2025-07-02 DIAGNOSIS — Z79.01 LONG TERM CURRENT USE OF ANTICOAGULANT: ICD-10-CM

## 2025-07-02 LAB
INR PPP: 2.87 (ref 0.85–1.19)
PROTHROMBIN TIME: 29.8 SECONDS (ref 12.3–15)

## 2025-07-02 PROCEDURE — 36415 COLL VENOUS BLD VENIPUNCTURE: CPT

## 2025-07-02 PROCEDURE — 85610 PROTHROMBIN TIME: CPT
